# Patient Record
Sex: FEMALE | Race: WHITE | NOT HISPANIC OR LATINO | Employment: UNEMPLOYED | URBAN - METROPOLITAN AREA
[De-identification: names, ages, dates, MRNs, and addresses within clinical notes are randomized per-mention and may not be internally consistent; named-entity substitution may affect disease eponyms.]

---

## 2017-04-04 ENCOUNTER — ALLSCRIPTS OFFICE VISIT (OUTPATIENT)
Dept: OTHER | Facility: OTHER | Age: 52
End: 2017-04-04

## 2017-04-04 DIAGNOSIS — E55.9 VITAMIN D DEFICIENCY: ICD-10-CM

## 2017-04-04 DIAGNOSIS — R53.83 OTHER FATIGUE: ICD-10-CM

## 2017-04-04 DIAGNOSIS — E78.5 HYPERLIPIDEMIA: ICD-10-CM

## 2017-04-04 DIAGNOSIS — F41.9 ANXIETY DISORDER: ICD-10-CM

## 2017-04-04 DIAGNOSIS — Z12.11 ENCOUNTER FOR SCREENING FOR MALIGNANT NEOPLASM OF COLON: ICD-10-CM

## 2017-04-08 ENCOUNTER — TRANSCRIBE ORDERS (OUTPATIENT)
Dept: ADMINISTRATIVE | Facility: HOSPITAL | Age: 52
End: 2017-04-08

## 2017-04-08 ENCOUNTER — APPOINTMENT (OUTPATIENT)
Dept: LAB | Facility: MEDICAL CENTER | Age: 52
End: 2017-04-08
Payer: COMMERCIAL

## 2017-04-08 DIAGNOSIS — R53.83 OTHER FATIGUE: ICD-10-CM

## 2017-04-08 DIAGNOSIS — E78.5 HYPERLIPIDEMIA: ICD-10-CM

## 2017-04-08 DIAGNOSIS — E55.9 VITAMIN D DEFICIENCY: ICD-10-CM

## 2017-04-08 DIAGNOSIS — F41.9 ANXIETY DISORDER: ICD-10-CM

## 2017-04-08 DIAGNOSIS — Z12.11 ENCOUNTER FOR SCREENING FOR MALIGNANT NEOPLASM OF COLON: ICD-10-CM

## 2017-04-08 LAB
25(OH)D3 SERPL-MCNC: 25.5 NG/ML (ref 30–100)
ALBUMIN SERPL BCP-MCNC: 3.8 G/DL (ref 3.5–5)
ALP SERPL-CCNC: 75 U/L (ref 46–116)
ALT SERPL W P-5'-P-CCNC: 24 U/L (ref 12–78)
ANION GAP SERPL CALCULATED.3IONS-SCNC: 8 MMOL/L (ref 4–13)
AST SERPL W P-5'-P-CCNC: 21 U/L (ref 5–45)
BASOPHILS # BLD AUTO: 0.02 THOUSANDS/ΜL (ref 0–0.1)
BASOPHILS NFR BLD AUTO: 0 % (ref 0–1)
BILIRUB SERPL-MCNC: 0.64 MG/DL (ref 0.2–1)
BUN SERPL-MCNC: 17 MG/DL (ref 5–25)
CALCIUM SERPL-MCNC: 8.4 MG/DL (ref 8.3–10.1)
CHLORIDE SERPL-SCNC: 105 MMOL/L (ref 100–108)
CHOLEST SERPL-MCNC: 198 MG/DL (ref 50–200)
CO2 SERPL-SCNC: 28 MMOL/L (ref 21–32)
CREAT SERPL-MCNC: 0.72 MG/DL (ref 0.6–1.3)
EOSINOPHIL # BLD AUTO: 0.07 THOUSAND/ΜL (ref 0–0.61)
EOSINOPHIL NFR BLD AUTO: 1 % (ref 0–6)
ERYTHROCYTE [DISTWIDTH] IN BLOOD BY AUTOMATED COUNT: 14.4 % (ref 11.6–15.1)
GFR SERPL CREATININE-BSD FRML MDRD: >60 ML/MIN/1.73SQ M
GLUCOSE P FAST SERPL-MCNC: 73 MG/DL (ref 65–99)
HCT VFR BLD AUTO: 40.6 % (ref 34.8–46.1)
HDLC SERPL-MCNC: 59 MG/DL (ref 40–60)
HGB BLD-MCNC: 13.1 G/DL (ref 11.5–15.4)
LDLC SERPL CALC-MCNC: 126 MG/DL (ref 0–100)
LYMPHOCYTES # BLD AUTO: 2.24 THOUSANDS/ΜL (ref 0.6–4.47)
LYMPHOCYTES NFR BLD AUTO: 43 % (ref 14–44)
MCH RBC QN AUTO: 29.7 PG (ref 26.8–34.3)
MCHC RBC AUTO-ENTMCNC: 32.3 G/DL (ref 31.4–37.4)
MCV RBC AUTO: 92 FL (ref 82–98)
MONOCYTES # BLD AUTO: 0.48 THOUSAND/ΜL (ref 0.17–1.22)
MONOCYTES NFR BLD AUTO: 9 % (ref 4–12)
NEUTROPHILS # BLD AUTO: 2.36 THOUSANDS/ΜL (ref 1.85–7.62)
NEUTS SEG NFR BLD AUTO: 47 % (ref 43–75)
NRBC BLD AUTO-RTO: 0 /100 WBCS
PLATELET # BLD AUTO: 193 THOUSANDS/UL (ref 149–390)
PMV BLD AUTO: 10.7 FL (ref 8.9–12.7)
POTASSIUM SERPL-SCNC: 4 MMOL/L (ref 3.5–5.3)
PROT SERPL-MCNC: 7.4 G/DL (ref 6.4–8.2)
RBC # BLD AUTO: 4.41 MILLION/UL (ref 3.81–5.12)
SODIUM SERPL-SCNC: 141 MMOL/L (ref 136–145)
TRIGL SERPL-MCNC: 65 MG/DL
TSH SERPL DL<=0.05 MIU/L-ACNC: 1.8 UIU/ML (ref 0.36–3.74)
WBC # BLD AUTO: 5.18 THOUSAND/UL (ref 4.31–10.16)

## 2017-04-08 PROCEDURE — 82306 VITAMIN D 25 HYDROXY: CPT

## 2017-04-08 PROCEDURE — 36415 COLL VENOUS BLD VENIPUNCTURE: CPT

## 2017-04-08 PROCEDURE — 80053 COMPREHEN METABOLIC PANEL: CPT

## 2017-04-08 PROCEDURE — 85025 COMPLETE CBC W/AUTO DIFF WBC: CPT

## 2017-04-08 PROCEDURE — 84443 ASSAY THYROID STIM HORMONE: CPT

## 2017-04-08 PROCEDURE — 80061 LIPID PANEL: CPT

## 2017-05-09 ENCOUNTER — TRANSCRIBE ORDERS (OUTPATIENT)
Dept: ADMINISTRATIVE | Facility: HOSPITAL | Age: 52
End: 2017-05-09

## 2017-05-09 DIAGNOSIS — R52 PAIN: Primary | ICD-10-CM

## 2017-05-09 DIAGNOSIS — Z12.31 ENCOUNTER FOR MAMMOGRAM TO ESTABLISH BASELINE MAMMOGRAM: ICD-10-CM

## 2017-05-09 DIAGNOSIS — N83.201 CYST OF RIGHT OVARY: ICD-10-CM

## 2017-05-30 ENCOUNTER — HOSPITAL ENCOUNTER (OUTPATIENT)
Dept: RADIOLOGY | Age: 52
Discharge: HOME | End: 2017-05-30
Payer: COMMERCIAL

## 2017-05-30 DIAGNOSIS — R52 PAIN: ICD-10-CM

## 2017-05-30 DIAGNOSIS — Z12.31 ENCOUNTER FOR MAMMOGRAM TO ESTABLISH BASELINE MAMMOGRAM: ICD-10-CM

## 2017-05-30 DIAGNOSIS — N83.201 CYST OF RIGHT OVARY: ICD-10-CM

## 2017-05-30 PROCEDURE — 76830 TRANSVAGINAL US NON-OB: CPT

## 2017-05-30 PROCEDURE — 76856 US EXAM PELVIC COMPLETE: CPT

## 2017-05-30 PROCEDURE — G0202 SCR MAMMO BI INCL CAD: HCPCS

## 2017-08-03 ENCOUNTER — TRANSCRIBE ORDERS (OUTPATIENT)
Dept: ADMINISTRATIVE | Facility: HOSPITAL | Age: 52
End: 2017-08-03

## 2017-08-03 DIAGNOSIS — N83.202 BILATERAL OVARIAN CYSTS: Primary | ICD-10-CM

## 2017-08-03 DIAGNOSIS — N83.201 BILATERAL OVARIAN CYSTS: Primary | ICD-10-CM

## 2017-08-24 ENCOUNTER — HOSPITAL ENCOUNTER (OUTPATIENT)
Dept: RADIOLOGY | Age: 52
Discharge: HOME | End: 2017-08-24
Payer: COMMERCIAL

## 2017-08-24 ENCOUNTER — ALLSCRIPTS OFFICE VISIT (OUTPATIENT)
Dept: OTHER | Facility: OTHER | Age: 52
End: 2017-08-24

## 2017-08-24 DIAGNOSIS — R01.1 CARDIAC MURMUR: ICD-10-CM

## 2017-08-24 DIAGNOSIS — N83.202 BILATERAL OVARIAN CYSTS: ICD-10-CM

## 2017-08-24 DIAGNOSIS — N83.201 BILATERAL OVARIAN CYSTS: ICD-10-CM

## 2017-08-24 PROCEDURE — 76830 TRANSVAGINAL US NON-OB: CPT

## 2017-08-24 PROCEDURE — 76856 US EXAM PELVIC COMPLETE: CPT

## 2017-09-21 ENCOUNTER — GENERIC CONVERSION - ENCOUNTER (OUTPATIENT)
Dept: OTHER | Facility: OTHER | Age: 52
End: 2017-09-21

## 2017-10-05 ENCOUNTER — TRANSCRIBE ORDERS (OUTPATIENT)
Dept: ADMINISTRATIVE | Facility: HOSPITAL | Age: 52
End: 2017-10-05

## 2017-10-05 ENCOUNTER — APPOINTMENT (OUTPATIENT)
Dept: URGENT CARE | Facility: MEDICAL CENTER | Age: 52
End: 2017-10-05
Payer: COMMERCIAL

## 2017-10-05 ENCOUNTER — GENERIC CONVERSION - ENCOUNTER (OUTPATIENT)
Dept: OTHER | Facility: OTHER | Age: 52
End: 2017-10-05

## 2017-10-05 ENCOUNTER — APPOINTMENT (OUTPATIENT)
Dept: LAB | Facility: MEDICAL CENTER | Age: 52
End: 2017-10-05
Payer: COMMERCIAL

## 2017-10-05 DIAGNOSIS — N83.01 FOLLICULAR CYST OF RIGHT OVARY: Primary | ICD-10-CM

## 2017-10-05 LAB
ATRIAL RATE: 57 BPM
BASOPHILS # BLD AUTO: 0.02 THOUSANDS/ΜL (ref 0–0.1)
BASOPHILS NFR BLD AUTO: 0 % (ref 0–1)
EOSINOPHIL # BLD AUTO: 0.06 THOUSAND/ΜL (ref 0–0.61)
EOSINOPHIL NFR BLD AUTO: 1 % (ref 0–6)
ERYTHROCYTE [DISTWIDTH] IN BLOOD BY AUTOMATED COUNT: 13.9 % (ref 11.6–15.1)
HCT VFR BLD AUTO: 39 % (ref 34.8–46.1)
HGB BLD-MCNC: 13.1 G/DL (ref 11.5–15.4)
LYMPHOCYTES # BLD AUTO: 3.2 THOUSANDS/ΜL (ref 0.6–4.47)
LYMPHOCYTES NFR BLD AUTO: 34 % (ref 14–44)
MCH RBC QN AUTO: 30.7 PG (ref 26.8–34.3)
MCHC RBC AUTO-ENTMCNC: 33.6 G/DL (ref 31.4–37.4)
MCV RBC AUTO: 91 FL (ref 82–98)
MONOCYTES # BLD AUTO: 0.6 THOUSAND/ΜL (ref 0.17–1.22)
MONOCYTES NFR BLD AUTO: 6 % (ref 4–12)
NEUTROPHILS # BLD AUTO: 5.52 THOUSANDS/ΜL (ref 1.85–7.62)
NEUTS SEG NFR BLD AUTO: 59 % (ref 43–75)
NRBC BLD AUTO-RTO: 0 /100 WBCS
P AXIS: 14 DEGREES
PLATELET # BLD AUTO: 214 THOUSANDS/UL (ref 149–390)
PMV BLD AUTO: 10.4 FL (ref 8.9–12.7)
PR INTERVAL: 172 MS
QRS AXIS: -6 DEGREES
QRSD INTERVAL: 82 MS
QT INTERVAL: 450 MS
QTC INTERVAL: 438 MS
RBC # BLD AUTO: 4.27 MILLION/UL (ref 3.81–5.12)
T WAVE AXIS: 85 DEGREES
VENTRICULAR RATE: 57 BPM
WBC # BLD AUTO: 9.42 THOUSAND/UL (ref 4.31–10.16)

## 2017-10-05 PROCEDURE — 36415 COLL VENOUS BLD VENIPUNCTURE: CPT | Performed by: OBSTETRICS & GYNECOLOGY

## 2017-10-05 PROCEDURE — 85025 COMPLETE CBC W/AUTO DIFF WBC: CPT | Performed by: OBSTETRICS & GYNECOLOGY

## 2017-10-05 PROCEDURE — 93005 ELECTROCARDIOGRAM TRACING: CPT

## 2017-10-06 ENCOUNTER — ANESTHESIA EVENT (OUTPATIENT)
Dept: PERIOP | Facility: HOSPITAL | Age: 52
End: 2017-10-06
Payer: COMMERCIAL

## 2017-10-06 RX ORDER — CITALOPRAM 20 MG/1
10 TABLET ORAL DAILY
COMMUNITY
End: 2018-03-05 | Stop reason: ALTCHOICE

## 2017-10-11 ENCOUNTER — ALLSCRIPTS OFFICE VISIT (OUTPATIENT)
Dept: OTHER | Facility: OTHER | Age: 52
End: 2017-10-11

## 2017-10-11 ENCOUNTER — TRANSCRIBE ORDERS (OUTPATIENT)
Dept: ADMINISTRATIVE | Facility: HOSPITAL | Age: 52
End: 2017-10-11

## 2017-10-11 DIAGNOSIS — Z01.810 PRE-OPERATIVE CARDIOVASCULAR EXAMINATION: Primary | ICD-10-CM

## 2017-10-11 DIAGNOSIS — Z01.810 ENCOUNTER FOR PREPROCEDURAL CARDIOVASCULAR EXAMINATION: ICD-10-CM

## 2017-10-12 ENCOUNTER — ALLSCRIPTS OFFICE VISIT (OUTPATIENT)
Dept: OTHER | Facility: OTHER | Age: 52
End: 2017-10-12

## 2017-10-13 ENCOUNTER — HOSPITAL ENCOUNTER (OUTPATIENT)
Dept: NON INVASIVE DIAGNOSTICS | Facility: HOSPITAL | Age: 52
Discharge: HOME | End: 2017-10-13
Payer: COMMERCIAL

## 2017-10-13 DIAGNOSIS — Z01.810 PRE-OPERATIVE CARDIOVASCULAR EXAMINATION: ICD-10-CM

## 2017-10-13 LAB
ARRHY DURING EX: NORMAL
CHEST PAIN STATEMENT: NORMAL
MAX DIASTOLIC BP: 80 MMHG
MAX HEART RATE: 162 BPM
MAX PREDICTED HEART RATE: 168 BPM
MAX. SYSTOLIC BP: 168 MMHG
PROTOCOL NAME: NORMAL
REASON FOR TERMINATION: NORMAL
TARGET HR FORMULA: NORMAL
TEST INDICATION: NORMAL
TIME IN EXERCISE PHASE: 331 S

## 2017-10-13 PROCEDURE — 93350 STRESS TTE ONLY: CPT

## 2017-10-13 NOTE — H&P
H&P Exam - Gynecology   Britta Cruz 46 y o  female MRN: [id number]  Unit/Bed#:  Encounter: [id number]      Assessment: chronic right pain                          Complex right ovarian cyst    Plan: laparoscopic BSO      HPI:  Britta Cruz is a 46 y o   female who presents with chronic right pelvic pain worse over the past year  U/S: ut; 6 8 x 3 4 x 5 cm  No fibroids  Left ovary:not seen, right ovary; complex 2 2cm cyst   PAP 3/17: normal            Pt denies any vaginal bleeding  LMP;  No HRT  Review of Systems   Constitutional: Negative  HENT: Negative  Eyes: Negative  Respiratory: Negative  Cardiovascular: Negative  Gastrointestinal: Negative  Endocrine: Negative  Genitourinary: Positive for pelvic pain  Musculoskeletal: Negative  Skin: Negative  Allergic/Immunologic: Negative  Neurological: Negative  Hematological: Negative  Psychiatric/Behavioral: Negative  Historical Information   Past Medical History:   Diagnosis Date    Murmur     MVP (mitral valve prolapse)     PONV (postoperative nausea and vomiting)      Past Surgical History:   Procedure Laterality Date    APPENDECTOMY       SECTION      EXPLORATORY LAPAROTOMY      THYROID SURGERY      THYROID SURGERY      goiter     OB/GYN History:c/section x2    No family history on file  Social History   History   Alcohol Use    0 6 oz/week    1 Glasses of wine per week     History   Drug Use No     History   Smoking Status    Never Smoker   Smokeless Tobacco    Never Used     MEDS: see list    Allergies   Allergen Reactions    Morphine     Nafcillin     Septra [Sulfamethoxazole-Trimethoprim]        Objective   Vitals: There were no vitals taken for this visit  No intake or output data in the 24 hours ending 10/13/17 1045    Invasive Devices:    Invasive Devices          No matching active lines, drains, or airways          Physical Exam   Constitutional: She is oriented to person, place, and time  She appears well-developed and well-nourished  HENT:   Head: Normocephalic and atraumatic  Eyes: Conjunctivae and EOM are normal  Pupils are equal, round, and reactive to light  Neck: Normal range of motion  Neck supple  Cardiovascular: Normal rate and regular rhythm  Pulmonary/Chest: Effort normal and breath sounds normal    Abdominal: Soft  Bowel sounds are normal    Genitourinary: Vagina normal and uterus normal  Cervix exhibits no motion tenderness, no discharge and no friability  Right adnexum displays tenderness  Left adnexum displays no mass and no tenderness  No erythema, tenderness or bleeding in the vagina  No foreign body in the vagina  No signs of injury around the vagina  No vaginal discharge found  Musculoskeletal: Normal range of motion  Neurological: She is alert and oriented to person, place, and time  She has normal reflexes  Skin: Skin is warm and dry  Psychiatric: She has a normal mood and affect   Her behavior is normal  Judgment and thought content normal

## 2017-10-13 NOTE — CONSULTS
Assessment  1  Cardiac murmur (785 2) (R01 1)   2  Abnormal EKG (794 31) (R94 31)   3  Preoperative cardiovascular examination (V72 81) (Z01 810)    Plan  Preoperative cardiovascular examination    · ECHO STRESS TEST W CONTRAST IF INDICATED; Status:Need Information - Financial  Authorization; Requested for:11Oct2017;    Perform:[facility]; Due:11Oct2018; Ordered; For:Preoperative cardiovascular examination; Ordered By:Jason Roberson;   · Follow-up PRN Evaluation and Treatment  Follow-up  Status: Complete  Done:  [id number]   Ordered; For: Preoperative cardiovascular examination; Ordered By: Ana Boyce Performed:  Due: 65NJE3498    Discussion/Summary    Preop cardiac clearance  abnormal ekg and murmur  non invasive eval recommended  Chief Complaint  Pt  here for cardiac clearance prior to having gyn/ovarian surgery on 10/17/2017  Pt  has occasional palpitations  History of Present Illness  Cardiology HPI Free Text Note Form [facility]: Preoperative cardiac clearance, history heart murmur  Currently having no symptoms  Recent EKG personally reviewed reveals normal sinus rhythm, there is lateral T-wave inversions slightly abnormal  Temporary progression across the precordium  Review of Systems      Cardiac: has heart murmur present, but-as noted in HPI,-no chest pain,-no rhythm problems,-no fainting/blackouts,-no signs of swelling,-no palpitations present,-no syncope/fainting,-no AM fatigue-and-no witnessed apnea episodes  Skin: No complaints of nonhealing sores or skin rash ,-no non healing sores present-and-no rashes seen   Genitourinary: post menopausal, but-no recurrent urinary tract infections,-no difficult urination,-no blood in urine,-no loss of bladder control-and-no kidney problems   Psychological: No complaints of feeling depressed, anxiety, panic attacks, or difficulty concentrating ,-no depression-and-no anxiety     General: No complaints of trouble sleeping, lack of energy, fatigue, appetite changes, weight changes, fever, frequent infections, or night sweats  ,-no trouble sleeping,-no appetite changes,-no changes in weight-and-no lack of energy/fatigue  Respiratory: No complaints of shortness of breath, cough with sputum, or wheezing ,-no shortness of breath,-no cough/sputum,-no wheezing-and-no hemoptysis  HEENT: hearing problems, but-no serious eye problems   Gastrointestinal: No complaints of liver problems, nausea, vomiting, heartburn, constipation, bloody stools, diarrhea, problems swallowing, adbominal pain, or rectal bleeding ,-no liver problems,-no bloody stools,-no abdonimal pain-and-no rectal bleeding   Hematologic: No complaints of bleeding disorders, anemia, blood clots, or excessive brusing ,-no bleeding disorders-and-no excessive bruising   Neurological: No complaints of numbness, tingling, dizziness, weakness, seizures, headaches, syncope or fainting, AM fatigue, daytime sleepiness, no witnessed apnea episodes  ,-no numbness,-no tingling,-no weakness,-no seizures,-no headaches,-no dizziness,-no diplopia-and-no daytime sleepiness   Musculoskeletal: No complaints of arthritis, back pain, or painfull swelling ,-no arthritis,-no back pain-and-no swelling/pain      Active Problems  1  Abdominal pain (789 00) (R10 9)   2  Acute sinusitis (461 9) (J01 90)   3  Acute URI (465 9) (J06 9)   4  Allergic rhinitis (477 9) (J30 9)   5  Anxiety (300 00) (F41 9)   6  Bronchitis (490) (J40)   7  Cardiac murmur (785 2) (R01 1)   8  Chest wall injury (959 11) (S29 9XXA)   9  Chronic depressive disorder (301 12) (F32 9)   10  Colon cancer screening (V76 51) (Z12 11)   11  Complete Colonoscopy   12  Cough (786 2) (R05)   13  Elbow pain (719 42) (M25 529)   14  Fatigue (780 79) (R53 83)   15  Hyperlipidemia (272 4) (E78 5)   16  Left shoulder pain (719 41) (M25 512)   17  Lower back pain (724 2) (M54 5)   18  Muscle strain of chest wall (848 8) (S29 011A)   19   Need for influenza vaccination (V04 81) (Z23)   20  Pharyngitis (462) (J02 9)   21  Rosacea (695 3) (L71 9)   22  Sacroiliitis (720 2) (M46 1)   23  Vitamin D deficiency (268 9) (E55 9)   24  Well adult on routine health check (V70 0) (Z00 00)    Past Medical History   · History of Denial Of Any Significant Medical History   · Former smoker (N90 45) (W92 382)   · History of Influenza vaccination administered during current admission (V04 81) (Z23)    The active problems and past medical history were reviewed and updated today  Surgical History   · History of  Section   · History of Exploratory Laparoscopy   · History of Throat Surgery    The surgical history was reviewed and updated today  Family History  Mother    · Family history of Mother  At Age 72   · Family history of Stroke Syndrome (V17 1)  Father    · Family history of Cancer   · Family history of Father  At Age 79  Family History Reviewed: The family history was reviewed and updated today  Social History   · Being A Social Drinker   · Caffeine Use   · Former smoker (U52 64) (X75 264)  The social history was reviewed and updated today  The social history was reviewed and is unchanged  Current Meds   1  Citalopram Hydrobromide 10 MG Oral Tablet; TAKE 1 TABLET DAILY; Therapy: 08TKP3072 to (Evaluate:2018)  Requested for: 64EMB2211; Last   Rx:[id number] Ordered   2  Ibuprofen 600 MG Oral Tablet; TAKE 1 TABLET 3 TIMES DAILY AS NEEDED; Therapy: 52PVC6653 to (Evaluate:00Vwa6140)  Requested for: 2016; Last   Rx:2016 Ordered    The medication list was reviewed and updated today  Allergies  1  Morphine Sulfate SOLN   2  Nafcillin Sodium SOLR   3  Penicillins   4   Septra TABS    Vitals  Signs   Heart Rate: 72, Apical  Pulse Quality: Regular, Apical  Systolic: 103, RUE, Sitting  Diastolic: 70, RUE, Sitting  Height: 4 ft 8 5 in  Weight: 107 lb   BMI Calculated: 23 57  BSA Calculated: 1 37    Physical Exam    Constitutional General appearance: No acute distress, well appearing and well nourished  appears healthy,-within normal limits of ideal weight,-well hydrated-and-appearance reflects stated age  Eyes   Conjunctiva and Sclera examination: Conjunctiva pink, sclera anicteric  both conjunctiva were norml and pink  Ears, Nose, Mouth, and Throat - Oropharynx: Clear, nares are clear, mucous membranes are moist  Inspection of the oropharynx showed visible hard and soft palate, upper portion of tonsils and uvula (Mallampati class 2)  Oral mucosa was not pale  Neck   Neck and thyroid: Normal, supple, trachea midline, no thyromegaly  The neck was normal in appearance-and-was supple  the trachea was midline  Jugular Veins: the JVP was not elevated-and-no sustained hepatojugular reflux  The thyroid was normal-and-was not enlarged  There were no thyroid nodules  Pulmonary   Respiratory effort: No increased work of breathing or signs of respiratory distress  Respiratory rate: normal  Assessment of respiratory effort revealed normal rhythm and effort  Auscultation of lungs: Clear to auscultation, no rales, no rhonchi, no wheezing, good air movement  Auscultation of the lungs revealed no expiratory wheezing,-normal expiratory time-and-no inspiratory wheezing  no rales or crackles were heard bilaterally  no rhonchi  no friction rub  no wheezing  no diminished breath sounds  no bronchial breath sounds  Cardiovascular   Palpation of heart: Normal PMI, no thrills  The PMI was palpated at the 5th LICS in the midclavicular line  The apical impulse was normal  no precordial heave was noted  Auscultation of heart: Abnormal   The heart rate was normal  The rhythm was regular  Heart sounds: normal S1,-normal S2,-no gallop heard,-no click-and-the heart sounds were not distant  No pericardial rub  A grade 1 systolic murmur was heard at the LLSB  A grade 2 systolic murmur was heard at the RUSB   A grade 3 systolic murmur was heard at the LUSB  A grade 1 systolic murmur was heard at the RLSB  Carotid pulses: Normal, 2+ bilaterally  right 2+,-no bruit heard over the right carotid,-left 2+-and-no bruit heard over the left carotid  Peripheral vascular exam: Normal pulses throughout, no tenderness, erythema or swelling  Radial: right 2+,-left 2+  Femoral: right 2+,-no bruit heard over the right femoral artery,-left 2+,-no bruit heard over the left femoral artery  Posterior tibialis: right 2+,-left 2+  Dorsalis pedis: right 2+,-left 2+ no pulse delay  Capillary refill: capillary refill of the fingers was normal  no pitting edema present  no varicosital changes  Abdominal aorta: Normal     Chest - Chest: Normal    Abdomen   Abdomen: Non-tender and no distention  Liver and spleen: No hepatomegaly or splenomegaly  Musculoskeletal Digits and nails: Normal without clubbing or cyanosis  Examination of the extremities revealed no fingernail clubbing-and-well perfused fingers  Skin - Skin and subcutaneous tissue: Normal without rashes or lesions  Skin is warm and well perfused, normal turgor  Skin Inspection: fair complexion, but-normal color and pigmentation,-no cyanosis-and-no diaphoresis  Neurologic - Speech: Normal     Psychiatric - Orientation to person, place, and time: Normal -Mood and affect: Normal       Future Appointments    Date/Time Provider Specialty Site   10/12/2017 06:30 PM DANIEL Del Castillo  [address] [facility]   02/28/2018 10:00 AM DANIEL Del Castillo  [address]     End of Encounter Meds  1  Citalopram Hydrobromide 10 MG Oral Tablet; TAKE 1 TABLET DAILY; Therapy: 23RLG8076 to (Evaluate:26Mar2018)  Requested for: 18FWK1747; Last   Rx:[id number] Ordered  2  Ibuprofen 600 MG Oral Tablet; TAKE 1 TABLET 3 TIMES DAILY AS NEEDED;    Therapy: 21ZDA3428 to (Evaluate:10Niz2797)  Requested for: 30Jun2016; Last   ND:65WGU4683 Ordered    Signatures   Electronically signed by : Yamileth Lozano [place], M D ; Oct 11 2017  3:05PM EST                       (Author)

## 2017-10-14 NOTE — PROGRESS NOTES
Assessment  1  Ovarian cyst (620 2) (N83 209)    Plan    Medically cleared for surgery pending cardiac clearance  Chief Complaint  Scheduled for Laparoscopic BSO on 10/17/17 with Dr Brook Kelley  Saw cardiology yesterday and has ECHO and Stress test scheduled for tomorrow before cardiac clearance is given by Dr Jenna Newby  History of Present Illness  Pre-Op Visit (Brief): The patient is being seen for a preoperative visit  The procedure is a(n) Bilateral oophorectomy scheduled for 10/17 with Dr Brook Kelley  The indication for surgery is chocolate cysts  Surgical Risk Assessment:   Prior Anesthesia: She had prior anesthesia,-- no prior adverse reaction to edidural anesthesia,-- no prior adverse reaction to spinal anesthesia,-- no prior adverse reaction to general anesthesia-- and-- Nausea and vomiting  Pertinent Past Medical History: no pertinent past medical history  Exercise Capacity: unable to walk four blocks without symptoms-- and-- unable to walk two flights of stairs without symptoms  Lifestyle Factors: denies alcohol use, denies tobacco use and denies illegal drug use  Symptoms: no easy bleeding,-- easy bruising,-- no frequent nosebleeds,-- no chest pain,-- no cough,-- no dyspnea,-- no edema,-- no palpitations-- and-- no wheezing  Other JAZMINE risk factors include age over 48, but-- normal BMI,-- female gender-- and-- normal neck circumference  Predicted risk of JAZMINE: None  Pertinent Family History: Mother had stroke, but-- no family history of an adverse reaction to anesthesia,-- no aneurysm,-- no bleeding problems,-- no sudden early deaths,-- no ischemic heart disease-- and-- no stroke  Living Situation: home is secure and supportive  HPI: Justina Osborn saw cardiology today for her heart murmur  Getting a stress test and echocardiogram tomorrow  She hopefully will then get cardiac clearance  Review of Systems    Cardiovascular: as noted in HPI  Respiratory: as noted in HPI     Gastrointestinal: No complaints of abdominal pain, no constipation, no nausea or vomiting, no diarrhea, no bloody stools  Genitourinary: No complaints of dysuria, no incontinence, no pelvic pain, no dysmenorrhea, no vaginal discharge or bleeding  Active Problems  1  Abdominal pain (789 00) (R10 9)   2  Abnormal EKG (794 31) (R94 31)   3  Acute sinusitis (461 9) (J01 90)   4  Acute URI (465 9) (J06 9)   5  Allergic rhinitis (477 9) (J30 9)   6  Anxiety (300 00) (F41 9)   7  Bronchitis (490) (J40)   8  Cardiac murmur (785 2) (R01 1)   9  Chest wall injury (959 11) (S29 9XXA)   10  Chronic depressive disorder (301 12) (F32 9)   11  Colon cancer screening (V76 51) (Z12 11)   12  Complete Colonoscopy   13  Cough (786 2) (R05)   14  Elbow pain (719 42) (M25 529)   15  Fatigue (780 79) (R53 83)   16  Hyperlipidemia (272 4) (E78 5)   17  Left shoulder pain (719 41) (M25 512)   18  Lower back pain (724 2) (M54 5)   19  Muscle strain of chest wall (848 8) (S29 011A)   20  Need for influenza vaccination (V04 81) (Z23)   21  Pharyngitis (462) (J02 9)   22  Preoperative cardiovascular examination (V72 81) (Z01 810)   23  Rosacea (695 3) (L71 9)   24  Sacroiliitis (720 2) (M46 1)   25  Vitamin D deficiency (268 9) (E55 9)   26  Well adult on routine health check (V70 0) (Z00 00)    Past Medical History   · History of Denial Of Any Significant Medical History   · Former smoker (V15 82) (S30 323)   · History of Influenza vaccination administered during current admission (V04 81) (Z23)    Surgical History   · History of  Section   · History of Exploratory Laparoscopy   · History of Throat Surgery    Family History  Mother    · Family history of Mother  At Age 72   · Family history of Stroke Syndrome (V17 1)  Father    · Family history of Cancer   · Family history of Father  At Age 79    Social History   · Being A Social Drinker   · Caffeine Use   · Former smoker (M33 32) (Z90 915)    Current Meds   1   Caltrate 600 Plus-Vit D TABS; Therapy: (Recorded:12Oct2017) to Recorded   2  Citalopram Hydrobromide 10 MG Oral Tablet; TAKE 1 TABLET DAILY; Therapy: 60HVV3786 to (Evaluate:26Mar2018)  Requested for: 58IBT0364; Last   Rx:[id number] Ordered    Allergies  1  Morphine Sulfate SOLN   2  Nafcillin Sodium SOLR   3  Penicillins   4  Septra TABS    Physical Exam    Constitutional   General appearance: No acute distress, well appearing and well nourished  Head and Face   Head and face: Normal     Palpation of the face and sinuses: No sinus tenderness  Eyes   Conjunctiva and lids: No swelling, erythema or discharge  Pupils and irises: Equal, round, reactive to light  Ears, Nose, Mouth, and Throat   Otoscopic examination: Tympanic membranes translucent with normal light reflex  Canals patent without erythema  Lips, teeth, and gums: Normal, good dentition  Oropharynx: Normal with no erythema, edema, exudate or lesions  Neck   Neck: Supple, symmetric, trachea midline, no masses  Thyroid: Normal, no thyromegaly  Pulmonary   Respiratory effort: No increased work of breathing or signs of respiratory distress  Auscultation of lungs: Clear to auscultation  Cardiovascular   Auscultation of heart: Abnormal  -- Systolic murmur at both sternal borders and apex  Grad 2/6  Carotid pulses: 2+ bilaterally  Abdominal aorta: Normal     Femoral pulses: 2+ bilaterally  Pedal pulses: 2+ bilaterally  Peripheral vascular exam: Normal     Examination of extremities for edema and/or varicosities: Normal     Abdomen   Abdomen: Non-tender, no masses  Liver and spleen: No hepatomegaly or splenomegaly  Lymphatic   Palpation of lymph nodes in neck: No lymphadenopathy  Palpation of lymph nodes in groin: No lymphadenopathy  Musculoskeletal   Gait and station: Normal     Psychiatric   Judgment and insight: Normal     Mood and affect: Normal        Results/Data  EKG/CBC WNL        End of Encounter Meds  1   Citalopram Hydrobromide 10 MG Oral Tablet; TAKE 1 TABLET DAILY; Therapy: 03LYE0292 to (Evaluate:26Mar2018)  Requested for: 32DMD7975; Last   Rx:[id number] Ordered  2  Caltrate 600 Plus-Vit D TABS; Therapy: (Recorded:12Oct2017) to Recorded    Future Appointments    Date/Time Provider Specialty Site   02/28/2018 10:00 AM DANIEL Arellano   [address] [facility] [employer]     Signatures   Electronically signed by : DANIEL Howard ; Oct 12 2017 10:48PM EST                       (Author)

## 2017-10-16 ENCOUNTER — GENERIC CONVERSION - ENCOUNTER (OUTPATIENT)
Dept: OTHER | Facility: OTHER | Age: 52
End: 2017-10-16

## 2017-10-17 ENCOUNTER — ANESTHESIA (OUTPATIENT)
Dept: PERIOP | Facility: HOSPITAL | Age: 52
End: 2017-10-17
Payer: COMMERCIAL

## 2017-10-17 ENCOUNTER — HOSPITAL ENCOUNTER (OUTPATIENT)
Facility: HOSPITAL | Age: 52
Setting detail: OUTPATIENT SURGERY
Discharge: HOME | End: 2017-10-17
Attending: OBSTETRICS & GYNECOLOGY | Admitting: OBSTETRICS & GYNECOLOGY
Payer: COMMERCIAL

## 2017-10-17 VITALS
HEART RATE: 86 BPM | BODY MASS INDEX: 22.25 KG/M2 | TEMPERATURE: 97.3 F | OXYGEN SATURATION: 99 % | RESPIRATION RATE: 18 BRPM | DIASTOLIC BLOOD PRESSURE: 65 MMHG | HEIGHT: 58 IN | SYSTOLIC BLOOD PRESSURE: 101 MMHG | WEIGHT: 106 LBS

## 2017-10-17 DIAGNOSIS — N83.201 UNSPECIFIED OVARIAN CYST, RIGHT SIDE: ICD-10-CM

## 2017-10-17 PROCEDURE — 88305 TISSUE EXAM BY PATHOLOGIST: CPT | Performed by: OBSTETRICS & GYNECOLOGY

## 2017-10-17 RX ORDER — METOCLOPRAMIDE HYDROCHLORIDE 5 MG/ML
10 INJECTION INTRAMUSCULAR; INTRAVENOUS ONCE AS NEEDED
Status: DISCONTINUED | OUTPATIENT
Start: 2017-10-17 | End: 2017-10-17 | Stop reason: HOSPADM

## 2017-10-17 RX ORDER — ONDANSETRON 2 MG/ML
4 INJECTION INTRAMUSCULAR; INTRAVENOUS ONCE AS NEEDED
Status: DISCONTINUED | OUTPATIENT
Start: 2017-10-17 | End: 2017-10-17 | Stop reason: HOSPADM

## 2017-10-17 RX ORDER — KETOROLAC TROMETHAMINE 30 MG/ML
INJECTION, SOLUTION INTRAMUSCULAR; INTRAVENOUS AS NEEDED
Status: DISCONTINUED | OUTPATIENT
Start: 2017-10-17 | End: 2017-10-17 | Stop reason: SURG

## 2017-10-17 RX ORDER — GLYCOPYRROLATE 0.2 MG/ML
INJECTION INTRAMUSCULAR; INTRAVENOUS AS NEEDED
Status: DISCONTINUED | OUTPATIENT
Start: 2017-10-17 | End: 2017-10-17 | Stop reason: SURG

## 2017-10-17 RX ORDER — EPHEDRINE SULFATE 50 MG/ML
INJECTION, SOLUTION INTRAVENOUS AS NEEDED
Status: DISCONTINUED | OUTPATIENT
Start: 2017-10-17 | End: 2017-10-17 | Stop reason: SURG

## 2017-10-17 RX ORDER — SCOLOPAMINE TRANSDERMAL SYSTEM 1 MG/1
1 PATCH, EXTENDED RELEASE TRANSDERMAL ONCE
Status: DISCONTINUED | OUTPATIENT
Start: 2017-10-17 | End: 2017-10-17 | Stop reason: HOSPADM

## 2017-10-17 RX ORDER — IBUPROFEN 600 MG/1
600 TABLET ORAL EVERY 6 HOURS PRN
Status: DISCONTINUED | OUTPATIENT
Start: 2017-10-17 | End: 2017-10-17 | Stop reason: HOSPADM

## 2017-10-17 RX ORDER — MIDAZOLAM HYDROCHLORIDE 1 MG/ML
INJECTION INTRAMUSCULAR; INTRAVENOUS AS NEEDED
Status: DISCONTINUED | OUTPATIENT
Start: 2017-10-17 | End: 2017-10-17 | Stop reason: SURG

## 2017-10-17 RX ORDER — FENTANYL CITRATE/PF 50 MCG/ML
25 SYRINGE (ML) INJECTION
Status: DISCONTINUED | OUTPATIENT
Start: 2017-10-17 | End: 2017-10-17 | Stop reason: HOSPADM

## 2017-10-17 RX ORDER — ROCURONIUM BROMIDE 10 MG/ML
INJECTION, SOLUTION INTRAVENOUS AS NEEDED
Status: DISCONTINUED | OUTPATIENT
Start: 2017-10-17 | End: 2017-10-17 | Stop reason: SURG

## 2017-10-17 RX ORDER — SODIUM CHLORIDE, SODIUM LACTATE, POTASSIUM CHLORIDE, CALCIUM CHLORIDE 600; 310; 30; 20 MG/100ML; MG/100ML; MG/100ML; MG/100ML
20 INJECTION, SOLUTION INTRAVENOUS CONTINUOUS
Status: DISCONTINUED | OUTPATIENT
Start: 2017-10-17 | End: 2017-10-17 | Stop reason: HOSPADM

## 2017-10-17 RX ORDER — PROPOFOL 10 MG/ML
INJECTION, EMULSION INTRAVENOUS AS NEEDED
Status: DISCONTINUED | OUTPATIENT
Start: 2017-10-17 | End: 2017-10-17 | Stop reason: SURG

## 2017-10-17 RX ORDER — FENTANYL CITRATE 50 UG/ML
INJECTION, SOLUTION INTRAMUSCULAR; INTRAVENOUS AS NEEDED
Status: DISCONTINUED | OUTPATIENT
Start: 2017-10-17 | End: 2017-10-17 | Stop reason: SURG

## 2017-10-17 RX ORDER — DIPHENHYDRAMINE HYDROCHLORIDE 50 MG/ML
12.5 INJECTION INTRAMUSCULAR; INTRAVENOUS ONCE AS NEEDED
Status: DISCONTINUED | OUTPATIENT
Start: 2017-10-17 | End: 2017-10-17 | Stop reason: HOSPADM

## 2017-10-17 RX ORDER — SODIUM CHLORIDE, SODIUM LACTATE, POTASSIUM CHLORIDE, CALCIUM CHLORIDE 600; 310; 30; 20 MG/100ML; MG/100ML; MG/100ML; MG/100ML
125 INJECTION, SOLUTION INTRAVENOUS CONTINUOUS
Status: DISCONTINUED | OUTPATIENT
Start: 2017-10-17 | End: 2017-10-17 | Stop reason: HOSPADM

## 2017-10-17 RX ORDER — PROPOFOL 10 MG/ML
INJECTION, EMULSION INTRAVENOUS CONTINUOUS PRN
Status: DISCONTINUED | OUTPATIENT
Start: 2017-10-17 | End: 2017-10-17 | Stop reason: SURG

## 2017-10-17 RX ORDER — OXYCODONE HYDROCHLORIDE AND ACETAMINOPHEN 5; 325 MG/1; MG/1
1 TABLET ORAL EVERY 4 HOURS PRN
Status: DISCONTINUED | OUTPATIENT
Start: 2017-10-17 | End: 2017-10-17 | Stop reason: HOSPADM

## 2017-10-17 RX ORDER — BUPIVACAINE HYDROCHLORIDE 2.5 MG/ML
INJECTION, SOLUTION INFILTRATION; PERINEURAL AS NEEDED
Status: DISCONTINUED | OUTPATIENT
Start: 2017-10-17 | End: 2017-10-17 | Stop reason: HOSPADM

## 2017-10-17 RX ORDER — LIDOCAINE HYDROCHLORIDE 10 MG/ML
INJECTION, SOLUTION INFILTRATION; PERINEURAL AS NEEDED
Status: DISCONTINUED | OUTPATIENT
Start: 2017-10-17 | End: 2017-10-17 | Stop reason: SURG

## 2017-10-17 RX ORDER — ONDANSETRON 2 MG/ML
INJECTION INTRAMUSCULAR; INTRAVENOUS AS NEEDED
Status: DISCONTINUED | OUTPATIENT
Start: 2017-10-17 | End: 2017-10-17 | Stop reason: SURG

## 2017-10-17 RX ADMIN — FENTANYL CITRATE 25 MCG: 50 INJECTION INTRAMUSCULAR; INTRAVENOUS at 09:54

## 2017-10-17 RX ADMIN — SODIUM CHLORIDE, SODIUM LACTATE, POTASSIUM CHLORIDE, AND CALCIUM CHLORIDE: .6; .31; .03; .02 INJECTION, SOLUTION INTRAVENOUS at 07:45

## 2017-10-17 RX ADMIN — PROPOFOL 100 MCG/KG/MIN: 10 INJECTION, EMULSION INTRAVENOUS at 08:03

## 2017-10-17 RX ADMIN — EPHEDRINE SULFATE 5 MG: 50 INJECTION, SOLUTION INTRAMUSCULAR; INTRAVENOUS; SUBCUTANEOUS at 08:10

## 2017-10-17 RX ADMIN — ONDANSETRON 4 MG: 2 INJECTION INTRAMUSCULAR; INTRAVENOUS at 09:04

## 2017-10-17 RX ADMIN — SCOPOLAMINE 1 PATCH: 1 PATCH, EXTENDED RELEASE TRANSDERMAL at 07:13

## 2017-10-17 RX ADMIN — NEOSTIGMINE METHYLSULFATE 3 MG: 1 INJECTION, SOLUTION INTRAMUSCULAR; INTRAVENOUS; SUBCUTANEOUS at 09:26

## 2017-10-17 RX ADMIN — GLYCOPYRROLATE 0.6 MG: 0.2 INJECTION, SOLUTION INTRAMUSCULAR; INTRAVENOUS at 09:26

## 2017-10-17 RX ADMIN — FENTANYL CITRATE 100 MCG: 50 INJECTION, SOLUTION INTRAMUSCULAR; INTRAVENOUS at 07:54

## 2017-10-17 RX ADMIN — DEXAMETHASONE SODIUM PHOSPHATE 10 MG: 10 INJECTION INTRAMUSCULAR; INTRAVENOUS at 08:01

## 2017-10-17 RX ADMIN — OXYCODONE HYDROCHLORIDE AND ACETAMINOPHEN 1 TABLET: 5; 325 TABLET ORAL at 11:09

## 2017-10-17 RX ADMIN — FENTANYL CITRATE 25 MCG: 50 INJECTION INTRAMUSCULAR; INTRAVENOUS at 10:15

## 2017-10-17 RX ADMIN — ROCURONIUM BROMIDE 30 MG: 10 INJECTION, SOLUTION INTRAVENOUS at 07:56

## 2017-10-17 RX ADMIN — LIDOCAINE HYDROCHLORIDE 80 MG: 10 INJECTION, SOLUTION INFILTRATION; PERINEURAL at 07:54

## 2017-10-17 RX ADMIN — PROPOFOL 150 MG: 10 INJECTION, EMULSION INTRAVENOUS at 07:54

## 2017-10-17 RX ADMIN — MIDAZOLAM HYDROCHLORIDE 2 MG: 1 INJECTION, SOLUTION INTRAMUSCULAR; INTRAVENOUS at 07:46

## 2017-10-17 RX ADMIN — SODIUM CHLORIDE, SODIUM LACTATE, POTASSIUM CHLORIDE, AND CALCIUM CHLORIDE: .6; .31; .03; .02 INJECTION, SOLUTION INTRAVENOUS at 09:35

## 2017-10-17 RX ADMIN — KETOROLAC TROMETHAMINE 30 MG: 30 INJECTION, SOLUTION INTRAMUSCULAR at 09:11

## 2017-10-17 NOTE — PROGRESS NOTES
Surgery Scheduling Form  Pre-Operative Risk Assessment:   Date Last Seen: 10/11/17   Date of Surgery: 10/1717   Type of Surgery: laparoscopic BSO   Surgeon Name: Dr Melvina Stubbs Number: [phone]     Pulmonary: No Pulmonary Contraindication for planned surgical procedure   Cardiac: No Cardiac Contraindication for planned surgical procedure   Further Testing Required: No   Anticoagulation: No   Patient Approved for Surgery: Yes   Clearing Doctor: Britt Covarrubias   Electronically signed by : DANIEL Zepeda ; Oct 16 2017  9:43AM EST                       (Author)

## 2017-10-17 NOTE — ANESTHESIA PREPROCEDURE EVALUATION
Review of Systems/Medical History  Patient summary reviewed  Chart reviewed  History of anesthetic complications PONV    Cardiovascular  EKG reviewed, Valvular heart disease , H/O Heart Murmur,   Comment: Summary stress test 10/2017    STRESS RESULTS: Duration of exercise was 5 min and 31 sec  The patient exercised to protocol stage 2  Maximal work rate was 7 METs  Functional capacity was decreased  Maximal heart rate during stress was 162 bpm ( 96 % of maximal predicted  heart rate)  Target heart rate was achieved  The heart rate response to stress was normal  Maximal systolic blood pressure during stress was 168 mmHg  There was normal resting blood pressure with an appropriate response to stress  The  rate-pressure product for the peak heart rate and blood pressure was 85315  There was no chest pain during stress  The stress test was terminated due to moderate fatigue      ECG CONCLUSIONS: The stress ECG was negative for ischemia  There were no stress arrhythmias or conduction abnormalities  ,  Pulmonary  Negative pulmonary ROS No COPD , No asthma: , No shortness of breath, No recent URI , ,        GI/Hepatic  Negative GI/hepatic ROS          Negative  ROS        Endo/Other  Negative endo/other ROS      GYN       Hematology  Negative hematology ROS      Musculoskeletal  Negative musculoskeletal ROS        Neurology  Negative neurology ROS      Psychology   Anxiety,            Physical Exam    Airway    Mallampati score: IV  TM Distance: >3 FB  Neck ROM: full     Dental       Cardiovascular  Rhythm: regular, Rate: normal, Cardiovascular exam normal    Pulmonary  Pulmonary exam normal Breath sounds clear to auscultation,     Other Findings        Anesthesia Plan  ASA Score- 2       Anesthesia Type- general with ASA Monitors  Additional Monitors:   Airway Plan: ETT  Induction- intravenous  Informed Consent- Anesthetic plan and risks discussed with patient    I personally reviewed this patient with the CRNA  Discussed and agreed on the Anesthesia Plan with the CRNA  Marika Lombardo

## 2017-10-17 NOTE — DISCHARGE INSTRUCTIONS
Laparoscopic Salpingo-oophorectomy   WHAT YOU SHOULD KNOW:   Laparoscopic salpingo-oophorectomy is surgery to remove one or both fallopian tubes together with the ovaries  AFTER YOU LEAVE:   Medicines:   · NSAIDs  help decrease swelling, pain, and fever  This medicine is available without a doctor's order  NSAIDs can cause stomach bleeding or kidney problems  If you take blood thinner medicine, always ask your primary healthcare provider (PHP) if NSAIDs are safe for you  Always read the medicine label and follow directions  · Acetaminophen  decreases pain and fever  It is available without a doctor's order  Ask how much to take and how often to take it  Follow directions  Acetaminophen can cause liver damage if not taken correctly  · Prescription pain medicine  may be given  Ask your PHP how to take this medicine safely  · Take your medicine as directed  Contact your PHP if you think your medicine is not helping or if you have side effects  Tell him if you are allergic to any medicine  Keep a list of the medicines, vitamins, and herbs you take  Include the amounts, and when and why you take them  Bring the list or the pill bottles to follow-up visits  Carry your medicine list with you in case of an emergency  Follow up with your surgeon or gynecologist as directed:  Write down your questions so you remember to ask them during your visits  Wound care:  Carefully wash the wound with soap and water  Dry the area and put on new, clean bandages as directed  Change your bandages when they get wet or dirty  Counseling: This surgery may be life-changing for you and your family  Sudden changes in the levels of your hormones may occur and cause mood swings and depression  You may feel angry, sad, or frightened, or cry frequently and unexpectedly  These feelings are normal  Talk to your caregivers, family, or friends about your feelings   You may need to attend meetings with a caregiver, family members, or other people who are close to you  These meetings can help everyone better understand your condition, surgery, and care  Activity:  Ask when you can return to your usual activities, such as exercise  It is best to start exercise slowly and do more as you get stronger  Exercise makes your heart stronger, lowers blood pressure, and keeps your bones healthy  Contact your surgeon or gynecologist if:   · You have a fever  · You have chills, a cough, or feel weak and achy  · You have nausea or are vomiting  · Your skin is itchy, swollen, or has a rash  · You have questions or concerns about your condition or care  Seek care immediately or call 911 if:   · You feel lightheaded, short of breath, and have chest pain  · You cough up blood  · Your arm or leg feels warm, tender, and painful  It may look swollen and red  · You feel something is bulging into your vagina, or you have vaginal bleeding  · You have lower abdominal or back pain that does not go away even after you take medicine  · You have pus or a foul-smelling odor coming from your vagina  · You have trouble urinating or having a bowel movement  · Blood soaks through your bandage  · Your symptoms return  © 2014 [employer] is for End User's use only and may not be sold, redistributed or otherwise used for commercial purposes  All illustrations and images included in CareNotes® are the copyrighted property of [employer] , [employer]  or [place]  The above information is an  only  It is not intended as medical advice for individual conditions or treatments  Talk to your doctor, nurse or pharmacist before following any medical regimen to see if it is safe and effective for you

## 2017-10-17 NOTE — OP NOTE
OPERATIVE REPORT  PATIENT NAME: Anne Oropeza    :  1965  MRN: [id number]  Pt Location: [facility] OR ROOM 05    SURGERY DATE: 10/17/2017    Surgeon(s) and Role:     * Vicki Nuñez MD - Primary     * Axel Tejeda MD - Assisting    Preop Diagnosis:  Unspecified ovarian cyst, right side [N83 201]    Post-Op Diagnosis Codes:     * Unspecified ovarian cyst, right side [N83 201]    Procedure(s) (LRB):  LAPAROSCOPIC BSO (N/A)    Specimen(s):  ID Type Source Tests Collected by Time Destination   1 : right ovary and fallopian tube Tissue Ovary, Right TISSUE EXAM Vicki Nuñez MD 10/17/2017 0900    2 : left ovary and falloian tube  Tissue Ovary, Left TISSUE EXAM Vicki Nuñez MD 10/17/2017 09        Estimated Blood Loss:   <10cc    Drains:   None    Anesthesia Type:   General    Operative Indications:  Unspecified ovarian cyst, right side [N83 201]      Operative Findings:  Normal appearing external genitalia  Normal appearing cervix  Uterus sounded to 7 cm  Omental adhesions to the anterior abdominal wall  Normal bilateral fallopian tubes and ovaries  Right ovarian cyst approximately 3-4 cm in size    Complications:   None    Procedure Technique:    The patient was taking to the operating room where a timeout was performed to confirm correct patient and correct procedure  The patient was given general anesthesia and was then positioned  on the operating table in the dorsal lithotomy position with legs Supported by stirrups  All pressure points were padded and the Kiera hugger was placed to maintain control of core body temperature  The patient was then prepped and draped in the usual sterile fashion  A straight catheter was used to drain urine from the bladder  A weighted speculum was inserted for visualization of the cervix, which was then grasped with a single-tooth tenaculum  A dilator was then attached to the tenaculum and used for uterine manipulation       Attention was then turned to the abdomen where a 5 mm vertical incision was made in the infraumbilical region and a 5 mm trocar was introduced under direct visualization with the laparoscope within the trocar  The laparoscope was then placed through the trocar and pneumoperitoneum was established, using carbon dioxide  The abdomen was inspected and omental adhesions to the anterior abdominal wall were noted  The uterus ovaries and tubes were identified and a right ovarian cyst appreciated  Two additional 5 mm lateral ports were inserted under direct visualization  As well as a 10 mm suprapubic port to assist with removal of specimen  Attention was turned to the pelvis where the  EnSeal device was used to lyse omental adhesions  The right ovary  was stabilized by grasping the ovarian  tissue with the atraumatic forceps  The ovary and fallopian tube were tented up at the inferior Infundibulum ligament and the EnSeal was placed across this and several bites were taken with good visualization while coagulating and cutting  Attention was then turned to the contralateral adnexa and the same procedure completed  The specimens were placed in an Endo-Catch bag and removed through the 10 mm port without difficulty  Both ovaries and tubes were sent to pathology  The pelvic and abdomen were inspected , and good  hemostasis was confirmed  Pneumoperitoneum was evacuated  The 10 mm port was closed with PDS and  the remaining ports were removed and the incisions was closed using  4-0 Monocryl  Suture and Histoacryl  Attention was then turned to the perineum  The sponge stick  was removed and hemostasis was confirmed  All needles , sponge, instruction count was correct x3 at the end of the procedure  The patient tolerated the procedure well and was transferred to the recovery room in stable condition  Dr Christiano Smyth was present and participate din the entire procedure         Patient Disposition:  PACU     SIGNATURE: Edward Rodriguez MD  DATE: October 17, 2017  TIME: 9:43 AM

## 2017-11-15 ENCOUNTER — LAB REQUISITION (OUTPATIENT)
Dept: LAB | Facility: HOSPITAL | Age: 52
End: 2017-11-15
Payer: COMMERCIAL

## 2017-11-15 ENCOUNTER — GENERIC CONVERSION - ENCOUNTER (OUTPATIENT)
Dept: OTHER | Facility: OTHER | Age: 52
End: 2017-11-15

## 2017-11-15 DIAGNOSIS — J02.9 ACUTE PHARYNGITIS: ICD-10-CM

## 2017-11-15 PROCEDURE — 87070 CULTURE OTHR SPECIMN AEROBIC: CPT | Performed by: FAMILY MEDICINE

## 2017-11-17 LAB — BACTERIA THROAT CULT: NORMAL

## 2018-01-10 NOTE — MISCELLANEOUS
Dear Jose J Brizuela,  We have been notified by the scheduling department that they have tried to reach you to schedule with the specialist   Bert Beams is a copy of the order  Please call them to schedule      Thank you,        Electronically signed by:Ligia Mcdowell OM  Sep 21 2017 10:19AM EST

## 2018-01-12 VITALS
DIASTOLIC BLOOD PRESSURE: 70 MMHG | BODY MASS INDEX: 22.61 KG/M2 | SYSTOLIC BLOOD PRESSURE: 106 MMHG | HEART RATE: 80 BPM | WEIGHT: 104.5 LBS | RESPIRATION RATE: 16 BRPM

## 2018-01-12 VITALS
DIASTOLIC BLOOD PRESSURE: 70 MMHG | SYSTOLIC BLOOD PRESSURE: 134 MMHG | WEIGHT: 107 LBS | BODY MASS INDEX: 23.08 KG/M2 | HEIGHT: 57 IN | HEART RATE: 72 BPM

## 2018-01-13 NOTE — CONSULTS
Chief Complaint  Scheduled for Laparoscopic BSO on 10/17/17 with Dr Ewelina Mason  Saw cardiology yesterday and has ECHO and Stress test scheduled for tomorrow before cardiac clearance is given by Dr Wil Loaiza  History of Present Illness  Pre-Op Visit (Brief): The patient is being seen for a preoperative visit  The procedure is a(n) Bilateral oophorectomy scheduled for 10/17 with Dr Ewelina Mason  The indication for surgery is chocolate cysts  Surgical Risk Assessment:   Prior Anesthesia: She had prior anesthesia, no prior adverse reaction to edidural anesthesia, no prior adverse reaction to spinal anesthesia, no prior adverse reaction to general anesthesia and Nausea and vomiting  Pertinent Past Medical History: no pertinent past medical history  Exercise Capacity: unable to walk four blocks without symptoms and unable to walk two flights of stairs without symptoms  Lifestyle Factors: denies alcohol use, denies tobacco use and denies illegal drug use  Symptoms: no easy bleeding, easy bruising, no frequent nosebleeds, no chest pain, no cough, no dyspnea, no edema, no palpitations and no wheezing  Other JAZMINE risk factors include age over 48, but normal BMI, female gender and normal neck circumference  Predicted risk of JAZMINE: None  Pertinent Family History: Mother had stroke, but no family history of an adverse reaction to anesthesia, no aneurysm, no bleeding problems, no sudden early deaths, no ischemic heart disease and no stroke  Living Situation: home is secure and supportive  HPI: Gillian Katz saw cardiology today for her heart murmur  Getting a stress test and echocardiogram tomorrow  She hopefully will then get cardiac clearance  Review of Systems    Cardiovascular: as noted in HPI  Respiratory: as noted in HPI  Gastrointestinal: No complaints of abdominal pain, no constipation, no nausea or vomiting, no diarrhea, no bloody stools     Genitourinary: No complaints of dysuria, no incontinence, no pelvic pain, no dysmenorrhea, no vaginal discharge or bleeding  Active Problems    1  Abdominal pain (789 00) (R10 9)   2  Abnormal EKG (794 31) (R94 31)   3  Acute sinusitis (461 9) (J01 90)   4  Acute URI (465 9) (J06 9)   5  Allergic rhinitis (477 9) (J30 9)   6  Anxiety (300 00) (F41 9)   7  Bronchitis (490) (J40)   8  Cardiac murmur (785 2) (R01 1)   9  Chest wall injury (959 11) (S29 9XXA)   10  Chronic depressive disorder (301 12) (F32 9)   11  Colon cancer screening (V76 51) (Z12 11)   12  Complete Colonoscopy   13  Cough (786 2) (R05)   14  Elbow pain (719 42) (M25 529)   15  Fatigue (780 79) (R53 83)   16  Hyperlipidemia (272 4) (E78 5)   17  Left shoulder pain (719 41) (M25 512)   18  Lower back pain (724 2) (M54 5)   19  Muscle strain of chest wall (848 8) (S29 011A)   20  Need for influenza vaccination (V04 81) (Z23)   21  Pharyngitis (462) (J02 9)   22  Preoperative cardiovascular examination (V72 81) (Z01 810)   23  Rosacea (695 3) (L71 9)   24  Sacroiliitis (720 2) (M46 1)   25  Vitamin D deficiency (268 9) (E55 9)   26  Well adult on routine health check (V70 0) (Z00 00)    Past Medical History    · History of Denial Of Any Significant Medical History   · Former smoker (V15 82) (Q02 893)   · History of Influenza vaccination administered during current admission (V04 81) (Z23)    Surgical History    · History of  Section   · History of Exploratory Laparoscopy   · History of Throat Surgery    Family History    · Family history of Mother  At Age 72   · Family history of Stroke Syndrome (V17 1)    · Family history of Cancer   · Family history of Father  At Age 79    Social History    · Being A Social Drinker   · Caffeine Use   · Former smoker (Y89 64) (L08 819)    Current Meds   1  Caltrate 600 Plus-Vit D TABS; Therapy: (Recorded:2017) to Recorded   2  Citalopram Hydrobromide 10 MG Oral Tablet; TAKE 1 TABLET DAILY;    Therapy: 79BQM7362 to (Anny Haddad)  Requested for: 23ZTM4038; Last   Rx:[id number] Ordered    Allergies    1  Morphine Sulfate SOLN   2  Nafcillin Sodium SOLR   3  Penicillins   4  Septra TABS    Physical Exam    Constitutional   General appearance: No acute distress, well appearing and well nourished  Head and Face   Head and face: Normal     Palpation of the face and sinuses: No sinus tenderness  Eyes   Conjunctiva and lids: No swelling, erythema or discharge  Pupils and irises: Equal, round, reactive to light  Ears, Nose, Mouth, and Throat   Otoscopic examination: Tympanic membranes translucent with normal light reflex  Canals patent without erythema  Lips, teeth, and gums: Normal, good dentition  Oropharynx: Normal with no erythema, edema, exudate or lesions  Neck   Neck: Supple, symmetric, trachea midline, no masses  Thyroid: Normal, no thyromegaly  Pulmonary   Respiratory effort: No increased work of breathing or signs of respiratory distress  Auscultation of lungs: Clear to auscultation  Cardiovascular   Auscultation of heart: Abnormal   Systolic murmur at both sternal borders and apex  Grad 2/6  Carotid pulses: 2+ bilaterally  Abdominal aorta: Normal     Femoral pulses: 2+ bilaterally  Pedal pulses: 2+ bilaterally  Peripheral vascular exam: Normal     Examination of extremities for edema and/or varicosities: Normal     Abdomen   Abdomen: Non-tender, no masses  Liver and spleen: No hepatomegaly or splenomegaly  Lymphatic   Palpation of lymph nodes in neck: No lymphadenopathy  Palpation of lymph nodes in groin: No lymphadenopathy  Musculoskeletal   Gait and station: Normal     Psychiatric   Judgment and insight: Normal     Mood and affect: Normal        Results/Data  EKG/CBC WNL        Assessment    1  Ovarian cyst (620 2) (N83 209)    Plan    Medically cleared for surgery pending cardiac clearance  End of Encounter Meds    1  Citalopram Hydrobromide 10 MG Oral Tablet; TAKE 1 TABLET DAILY;    Therapy: 74AEO5605 to (Evaluate:26Mar2018)  Requested for: 37ANO2456; Last   Rx:[id number] Ordered    2  Caltrate 600 Plus-Vit D TABS;    Therapy: (Recorded:12Oct2017) to Recorded    Signatures   Electronically signed by : DANIEL Mccann ; Oct 12 2017 10:48PM EST                       (Author)

## 2018-01-15 NOTE — PROGRESS NOTES
Assessment    1  Cardiac murmur (785 2) (R01 1)   2  Anxiety (300 00) (F41 9)   3  Chronic depressive disorder (301 12) (F32 9)   4  Vitamin D deficiency (268 9) (E55 9)   5  Encounter for preventive health examination (V70 0) (Z00 00)    Plan  Health Maintenance    · Begin or continue regular aerobic exercise  Gradually work up to at least 3 sessions of 30  minutes of exercise a week ; Status:Complete;   Done: 29GIW4273    As above  REcheck 6 months     Discussion/Summary    1  HM-up to date  Advised Tdap, Caltrate plus D   2  URI-advised AFrin or Sudafed prn     3  Cerumen impaction-will use softener at home  4  Anxiety-still some irritability; will increase citalopram to 20 mg   5  Vit D deficiency-mild-will take Caltrate plus D    6  Cardiac murmur-ECHO in 2015 showed no significant abnormalities  Will do f/u  History of Present Illness  HM, Adult Female: The patient is being seen for a health maintenance evaluation  General Health: The patient's health since the last visit is described as good  She has regular dental visits  (No recent dental checkup)   She complains of vision problems  Vision care includes an eye examination within the last year and dianboom care  She denies hearing loss  Lifestyle:  She consumes a diverse and healthy diet  (Eats fruits and vegetables  Eats white bread  Eating protein daily  Dietary calcium 1 daily  Caffeine 2 daily)   She does not have any weight concerns  She exercises regularly  (Walks occ  )   She does not use tobacco  She denies alcohol use  Screening:   HPI: Sofi Blood says her citalopram is working well and wants to continue  Sleeping is good  Mood Motivation good    More irritability and getting into more disagreements with her family  She complains of congestion for the past several days  Has pain right side of face  Very stuffy  No fever or chills  Patient says she 's been told she had a heart murmur since childhood but no abnormalities  Active Problems    1  Abdominal pain (789 00) (R10 9)   2  Acute sinusitis (461 9) (J01 90)   3  Acute URI (465 9) (J06 9)   4  Allergic rhinitis (477 9) (J30 9)   5  Anxiety (300 00) (F41 9)   6  Bronchitis (490) (J40)   7  Cardiac murmur (785 2) (R01 1)   8  Chest wall injury (959 11) (S29 9XXA)   9  Chronic depressive disorder (301 12) (F32 9)   10  Colon cancer screening (V76 51) (Z12 11)   11  Cough (786 2) (R05)   12  Elbow pain (719 42) (M25 529)   13  Fatigue (780 79) (R53 83)   14  Hyperlipidemia (272 4) (E78 5)   15  Left shoulder pain (719 41) (M25 512)   16  Lower back pain (724 2) (M54 5)   17  Muscle strain of chest wall (848 8) (S29 011A)   18  Need for influenza vaccination (V04 81) (Z23)   19  Pharyngitis (462) (J02 9)   20  Rosacea (695 3) (L71 9)   21  Sacroiliitis (720 2) (M46 1)   22  Vitamin D deficiency (268 9) (E55 9)   23  Well adult on routine health check (V70 0) (Z00 00)    Past Medical History    · History of Denial Of Any Significant Medical History   · Former smoker (V15 82) (Q32 562)   · History of Influenza vaccination administered during current admission (V04 81) (Z23)    Surgical History    · History of  Section   · History of Exploratory Laparoscopy   · History of Throat Surgery    Family History  Mother    · Family history of Mother  At Age 72   · Family history of Stroke Syndrome (V17 1)  Father    · Family history of Cancer   · Family history of Father  At Age 79    The family history was reviewed and updated today  Social History    · Being A Social Drinker   · Caffeine Use   · Former smoker (S72 62) (O19 768)    Current Meds   1  Citalopram Hydrobromide 10 MG Oral Tablet; TAKE 1 TABLET DAILY; Therapy: 77IYE5827 to (26 090 135)  Requested for: 2017; Last   Rx:[id number] Ordered   2  Ibuprofen 600 MG Oral Tablet; TAKE 1 TABLET 3 TIMES DAILY AS NEEDED;    Therapy: 52CRP5363 to (Evaluate:44Xbs1351)  Requested for: 59IIG8027; Last Rx: [id number] Ordered    Allergies    1  Morphine Sulfate SOLN   2  Nafcillin Sodium SOLR   3  Penicillins   4  Septra TABS    Vitals   Recorded: 33Zml3191 12:02PM   Heart Rate 68   Respiration 16   Systolic 556   Diastolic 68   Weight 928 lb 2 oz   BMI Calculated 22 93   BSA Calculated 1 35     Physical Exam    Constitutional   General appearance: No acute distress, well appearing and well nourished  Head and Face   Head and face: Normal     Eyes   Conjunctiva and lids: No swelling, erythema or discharge  Ears, Nose, Mouth, and Throat   Otoscopic examination: Tympanic membranes translucent with normal light reflex  Canals patent without erythema  Left TM occluded by cerumen  Oropharynx: Normal with no erythema, edema, exudate or lesions  Neck   Neck: Supple, symmetric, trachea midline, no masses  Thyroid: Normal, no thyromegaly  Pulmonary   Respiratory effort: No increased work of breathing or signs of respiratory distress  Auscultation of lungs: Clear to auscultation  Cardiovascular   Auscultation of heart: Normal rate and rhythm, normal S1 and S2, no murmurs  Carotid pulses: 2+ bilaterally  Abdominal aorta: Normal     Femoral pulses: 2+ bilaterally  Pedal pulses: 2+ bilaterally  Peripheral vascular exam: Normal     Examination of extremities for edema and/or varicosities: Normal     Abdomen   Abdomen: Non-tender, no masses  Liver and spleen: No hepatomegaly or splenomegaly  Lymphatic   Palpation of lymph nodes in neck: No lymphadenopathy  Palpation of lymph nodes in groin: No lymphadenopathy  Musculoskeletal   Gait and station: Normal     Psychiatric   Judgment and insight: Normal        Future Appointments    Date/Time Provider Specialty Site   02/28/2018 10:00 AM DANIEL Courtney   [address] [facility]     Signatures   Electronically signed by : DANIEL Arguello ; Aug 24 2017  2:15PM EST                       (Author)

## 2018-01-22 VITALS
BODY MASS INDEX: 22.87 KG/M2 | WEIGHT: 106 LBS | HEIGHT: 57 IN | RESPIRATION RATE: 16 BRPM | DIASTOLIC BLOOD PRESSURE: 70 MMHG | SYSTOLIC BLOOD PRESSURE: 100 MMHG | HEART RATE: 80 BPM | TEMPERATURE: 98.1 F

## 2018-01-22 VITALS
SYSTOLIC BLOOD PRESSURE: 124 MMHG | BODY MASS INDEX: 22.93 KG/M2 | DIASTOLIC BLOOD PRESSURE: 68 MMHG | WEIGHT: 104.13 LBS | HEART RATE: 68 BPM | RESPIRATION RATE: 16 BRPM

## 2018-02-05 ENCOUNTER — TRANSCRIBE ORDERS (OUTPATIENT)
Dept: ADMINISTRATIVE | Facility: HOSPITAL | Age: 53
End: 2018-02-05

## 2018-02-05 DIAGNOSIS — H90.3 SENSORINEURAL HEARING LOSS (SNHL) OF BOTH EARS: Primary | ICD-10-CM

## 2018-03-05 ENCOUNTER — OFFICE VISIT (OUTPATIENT)
Dept: FAMILY MEDICINE CLINIC | Facility: MEDICAL CENTER | Age: 53
End: 2018-03-05
Payer: COMMERCIAL

## 2018-03-05 VITALS
HEART RATE: 64 BPM | RESPIRATION RATE: 16 BRPM | DIASTOLIC BLOOD PRESSURE: 70 MMHG | SYSTOLIC BLOOD PRESSURE: 120 MMHG | WEIGHT: 112.4 LBS | BODY MASS INDEX: 24.76 KG/M2

## 2018-03-05 DIAGNOSIS — F41.9 ANXIETY: Primary | ICD-10-CM

## 2018-03-05 DIAGNOSIS — F32.A CHRONIC DEPRESSIVE DISORDER: ICD-10-CM

## 2018-03-05 PROCEDURE — 99213 OFFICE O/P EST LOW 20 MIN: CPT | Performed by: FAMILY MEDICINE

## 2018-03-05 RX ORDER — ALPRAZOLAM 0.25 MG/1
0.25 TABLET ORAL 2 TIMES DAILY PRN
Qty: 30 TABLET | Refills: 0 | Status: SHIPPED | OUTPATIENT
Start: 2018-03-05 | End: 2018-05-17 | Stop reason: SDUPTHER

## 2018-03-05 NOTE — PROGRESS NOTES
Tara Shah says she stopped the citalopram because of weight gain and feels like it was no helping  She has been having panic attacks since driving  Doesn't occur at other times  Doesn't matter who is driving  No prior history   says she  is more irritable but she does not think it is any more than normally  Mood is good overall as well as motivation  Going to school  O: /70 (Cuff Size: Standard)   Pulse 64   Resp 16   Wt 51 kg (112 lb 6 4 oz)   BMI 24 76 kg/m²   Weight up 8 lbs  Affect is normal      Assessment:  1  Depression-so far doing reasonably well without SSRI  Will monitor  2  Panic disorder-discussed options  Suggested therapy; Reviewed relaxation techniques and encouraged exercise    Since infrequent will give Rx for alprazolam as needed  Appropriate use reviewed  Could consider use of another SSRI; weight  gain ain issue  Plan:   Rx for alprazolam  CPX in 6 months

## 2018-05-17 DIAGNOSIS — F41.9 ANXIETY: ICD-10-CM

## 2018-05-20 RX ORDER — ALPRAZOLAM 0.25 MG/1
0.25 TABLET ORAL 2 TIMES DAILY PRN
Qty: 30 TABLET | Refills: 0 | OUTPATIENT
Start: 2018-05-20 | End: 2018-09-24 | Stop reason: SDUPTHER

## 2018-06-06 ENCOUNTER — TRANSCRIBE ORDERS (OUTPATIENT)
Dept: ADMINISTRATIVE | Facility: HOSPITAL | Age: 53
End: 2018-06-06

## 2018-06-06 DIAGNOSIS — Z12.31 VISIT FOR SCREENING MAMMOGRAM: Primary | ICD-10-CM

## 2018-06-11 ENCOUNTER — HOSPITAL ENCOUNTER (OUTPATIENT)
Dept: RADIOLOGY | Age: 53
Discharge: HOME | End: 2018-06-11
Payer: COMMERCIAL

## 2018-06-11 DIAGNOSIS — Z12.31 VISIT FOR SCREENING MAMMOGRAM: ICD-10-CM

## 2018-06-11 PROCEDURE — 77063 BREAST TOMOSYNTHESIS BI: CPT

## 2018-06-11 PROCEDURE — 77067 SCR MAMMO BI INCL CAD: CPT

## 2018-09-24 DIAGNOSIS — F41.9 ANXIETY: ICD-10-CM

## 2018-09-25 RX ORDER — ALPRAZOLAM 0.25 MG/1
0.25 TABLET ORAL 2 TIMES DAILY PRN
Qty: 30 TABLET | Refills: 0 | OUTPATIENT
Start: 2018-09-25 | End: 2019-05-16 | Stop reason: SDUPTHER

## 2018-11-29 ENCOUNTER — OFFICE VISIT (OUTPATIENT)
Dept: FAMILY MEDICINE CLINIC | Facility: MEDICAL CENTER | Age: 53
End: 2018-11-29
Payer: COMMERCIAL

## 2018-11-29 VITALS
DIASTOLIC BLOOD PRESSURE: 80 MMHG | HEIGHT: 57 IN | BODY MASS INDEX: 24.16 KG/M2 | WEIGHT: 112 LBS | SYSTOLIC BLOOD PRESSURE: 140 MMHG | RESPIRATION RATE: 16 BRPM | HEART RATE: 84 BPM

## 2018-11-29 DIAGNOSIS — Z00.00 ROUTINE ADULT HEALTH MAINTENANCE: ICD-10-CM

## 2018-11-29 DIAGNOSIS — E78.01 FAMILIAL HYPERCHOLESTEROLEMIA: Primary | ICD-10-CM

## 2018-11-29 DIAGNOSIS — F32.A CHRONIC DEPRESSIVE DISORDER: ICD-10-CM

## 2018-11-29 DIAGNOSIS — Z80.3 FAMILY HISTORY OF BREAST CANCER IN SISTER: ICD-10-CM

## 2018-11-29 DIAGNOSIS — Z23 NEED FOR IMMUNIZATION AGAINST INFLUENZA: ICD-10-CM

## 2018-11-29 DIAGNOSIS — E55.9 VITAMIN D DEFICIENCY: ICD-10-CM

## 2018-11-29 PROCEDURE — 90471 IMMUNIZATION ADMIN: CPT

## 2018-11-29 PROCEDURE — 99396 PREV VISIT EST AGE 40-64: CPT | Performed by: FAMILY MEDICINE

## 2018-11-29 PROCEDURE — 90682 RIV4 VACC RECOMBINANT DNA IM: CPT

## 2018-11-30 NOTE — PROGRESS NOTES
Annmarie Perez is here for CPX  She  is doing well  Has been very busy   Going back to school for     HH: She says her diet is not good  Eating more salt  Exercise walks the dog  Dietary calcium not much  Caffeine 2 coffee daily  Alcohol several glasses of wine at night  FH: unchanged  Father with  lymph cancer? Sister with breast cancer  SH: Lives with  and children; going back to school  She says her left shoulder hurts  Also clicks  Bad when she wakes up   Patient says she has of said tonight which is why her blood pressure was up  She said she saw cardiology last year for her follow-up of her systolic murmur  She said she was told she did not need to be seen any further  Echo was normal in 2015 and in 2017  stress echo was normal  I have reviewed the patient's medical history in detail and updated the computerized patient record  Gyn checkup Dr Keely Philip 2018  Mammogram 2018  Colonoscopy 2015  10 year f/u    O: /88 (Cuff Size: Standard)   Pulse 84   Resp 16   Ht 4' 8 5" (1 435 m)   Wt 50 8 kg (112 lb)   BMI 24 67 kg/m²   BP by me 140/80  Physical Exam   Constitutional: She is oriented to person, place, and time  She appears well-developed and well-nourished  HENT:   Head: Normocephalic  Right Ear: External ear normal    Left Ear: External ear normal    Nose: Nose normal    Mouth/Throat: Oropharynx is clear and moist    Eyes: Pupils are equal, round, and reactive to light  Conjunctivae and EOM are normal  No scleral icterus  Neck: Normal range of motion  Neck supple  Carotid bruit is not present  No thyroid mass and no thyromegaly present  Cardiovascular: Normal rate, regular rhythm, normal heart sounds and intact distal pulses  Pulses:       Femoral pulses are 2+ on the right side, and 2+ on the left side  Popliteal pulses are 2+ on the right side, and 2+ on the left side  Dorsalis pedis pulses are 2+ on the right side, and 2+ on the left side  Posterior tibial pulses are 2+ on the right side, and 2+ on the left side  Systolic murmur 1-8/4 at upper sternal border   Pulmonary/Chest: Effort normal and breath sounds normal  No respiratory distress  She has no wheezes  She has no rales  Abdominal: Soft  Normal aorta and bowel sounds are normal  She exhibits no distension and no mass  There is no hepatosplenomegaly  There is no tenderness  Musculoskeletal: Normal range of motion  She exhibits no edema  Lymphadenopathy:        Head (right side): No submandibular and no occipital adenopathy present  Head (left side): No submandibular and no occipital adenopathy present  She has no cervical adenopathy  Right: No inguinal and no supraclavicular adenopathy present  Left: No inguinal and no supraclavicular adenopathy present  Neurological: She is oriented to person, place, and time  Skin: No lesion and no rash noted  Psychiatric: She has a normal mood and affect  Her behavior is normal      Assessment  1  HM-immunizations updated  2  Elevated BP-probably stress related  Advised to watch her sodium intake  Continue to   3  FH breast cancer-requests genetic counselling  4  Systolic murmur -has had workup    Plan  Flu shot  Check BW    Genetic counselling referral   Recheck 6 months

## 2018-12-18 ENCOUNTER — APPOINTMENT (OUTPATIENT)
Dept: LAB | Facility: MEDICAL CENTER | Age: 53
End: 2018-12-18
Payer: COMMERCIAL

## 2018-12-18 DIAGNOSIS — E78.01 FAMILIAL HYPERCHOLESTEROLEMIA: ICD-10-CM

## 2018-12-18 DIAGNOSIS — E55.9 VITAMIN D DEFICIENCY: ICD-10-CM

## 2018-12-18 DIAGNOSIS — F32.A CHRONIC DEPRESSIVE DISORDER: ICD-10-CM

## 2018-12-18 LAB
25(OH)D3 SERPL-MCNC: 15.4 NG/ML (ref 30–100)
ALBUMIN SERPL BCP-MCNC: 4.3 G/DL (ref 3.5–5)
ALP SERPL-CCNC: 87 U/L (ref 46–116)
ALT SERPL W P-5'-P-CCNC: 27 U/L (ref 12–78)
ANION GAP SERPL CALCULATED.3IONS-SCNC: 6 MMOL/L (ref 4–13)
AST SERPL W P-5'-P-CCNC: 21 U/L (ref 5–45)
BASOPHILS # BLD AUTO: 0.05 THOUSANDS/ΜL (ref 0–0.1)
BASOPHILS NFR BLD AUTO: 1 % (ref 0–1)
BILIRUB SERPL-MCNC: 0.62 MG/DL (ref 0.2–1)
BUN SERPL-MCNC: 15 MG/DL (ref 5–25)
CALCIUM SERPL-MCNC: 8.9 MG/DL (ref 8.3–10.1)
CHLORIDE SERPL-SCNC: 105 MMOL/L (ref 100–108)
CHOLEST SERPL-MCNC: 266 MG/DL (ref 50–200)
CO2 SERPL-SCNC: 27 MMOL/L (ref 21–32)
CREAT SERPL-MCNC: 0.69 MG/DL (ref 0.6–1.3)
EOSINOPHIL # BLD AUTO: 0.07 THOUSAND/ΜL (ref 0–0.61)
EOSINOPHIL NFR BLD AUTO: 1 % (ref 0–6)
ERYTHROCYTE [DISTWIDTH] IN BLOOD BY AUTOMATED COUNT: 13.7 % (ref 11.6–15.1)
GFR SERPL CREATININE-BSD FRML MDRD: 100 ML/MIN/1.73SQ M
GLUCOSE P FAST SERPL-MCNC: 75 MG/DL (ref 65–99)
HCT VFR BLD AUTO: 43.4 % (ref 34.8–46.1)
HDLC SERPL-MCNC: 57 MG/DL (ref 40–60)
HGB BLD-MCNC: 13.6 G/DL (ref 11.5–15.4)
IMM GRANULOCYTES # BLD AUTO: 0.02 THOUSAND/UL (ref 0–0.2)
IMM GRANULOCYTES NFR BLD AUTO: 0 % (ref 0–2)
LDLC SERPL CALC-MCNC: 177 MG/DL (ref 0–100)
LYMPHOCYTES # BLD AUTO: 2.42 THOUSANDS/ΜL (ref 0.6–4.47)
LYMPHOCYTES NFR BLD AUTO: 35 % (ref 14–44)
MCH RBC QN AUTO: 29.4 PG (ref 26.8–34.3)
MCHC RBC AUTO-ENTMCNC: 31.3 G/DL (ref 31.4–37.4)
MCV RBC AUTO: 94 FL (ref 82–98)
MONOCYTES # BLD AUTO: 0.52 THOUSAND/ΜL (ref 0.17–1.22)
MONOCYTES NFR BLD AUTO: 8 % (ref 4–12)
NEUTROPHILS # BLD AUTO: 3.86 THOUSANDS/ΜL (ref 1.85–7.62)
NEUTS SEG NFR BLD AUTO: 55 % (ref 43–75)
NONHDLC SERPL-MCNC: 209 MG/DL
NRBC BLD AUTO-RTO: 0 /100 WBCS
PLATELET # BLD AUTO: 214 THOUSANDS/UL (ref 149–390)
PMV BLD AUTO: 10.6 FL (ref 8.9–12.7)
POTASSIUM SERPL-SCNC: 4 MMOL/L (ref 3.5–5.3)
PROT SERPL-MCNC: 8.5 G/DL (ref 6.4–8.2)
RBC # BLD AUTO: 4.63 MILLION/UL (ref 3.81–5.12)
SODIUM SERPL-SCNC: 138 MMOL/L (ref 136–145)
TRIGL SERPL-MCNC: 160 MG/DL
WBC # BLD AUTO: 6.94 THOUSAND/UL (ref 4.31–10.16)

## 2018-12-18 PROCEDURE — 80053 COMPREHEN METABOLIC PANEL: CPT

## 2018-12-18 PROCEDURE — 80061 LIPID PANEL: CPT

## 2018-12-18 PROCEDURE — 36415 COLL VENOUS BLD VENIPUNCTURE: CPT

## 2018-12-18 PROCEDURE — 82306 VITAMIN D 25 HYDROXY: CPT

## 2018-12-18 PROCEDURE — 85025 COMPLETE CBC W/AUTO DIFF WBC: CPT

## 2018-12-18 PROCEDURE — 86800 THYROGLOBULIN ANTIBODY: CPT

## 2018-12-18 PROCEDURE — 86376 MICROSOMAL ANTIBODY EACH: CPT

## 2018-12-19 LAB
THYROGLOB AB SERPL-ACNC: 1 IU/ML (ref 0–0.9)
THYROPEROXIDASE AB SERPL-ACNC: 10 IU/ML (ref 0–34)

## 2018-12-20 DIAGNOSIS — E55.9 VITAMIN D DEFICIENCY: Primary | ICD-10-CM

## 2018-12-20 RX ORDER — ERGOCALCIFEROL 1.25 MG/1
50000 CAPSULE ORAL WEEKLY
Qty: 12 CAPSULE | Refills: 0 | Status: SHIPPED | OUTPATIENT
Start: 2018-12-20 | End: 2020-09-03 | Stop reason: ALTCHOICE

## 2018-12-21 ENCOUNTER — TELEPHONE (OUTPATIENT)
Dept: FAMILY MEDICINE CLINIC | Facility: MEDICAL CENTER | Age: 53
End: 2018-12-21

## 2018-12-21 DIAGNOSIS — R79.89 ABNORMAL THYROID BLOOD TEST: Primary | ICD-10-CM

## 2018-12-21 NOTE — TELEPHONE ENCOUNTER
----- Message from Shweta Dong MD sent at 12/20/2018  9:35 PM EST -----  Notify of blood work results  All is good except for :  1  her lipid profile which shows a cholesterol higher than it has been at 266  Really needs to watch her diet and repeat knee year  2  Vitamin-D is low-she should have vitamin-D replacement with prescription for 3 months followed by vitamin D3 2000 units daily  This is likely been done in the past but she did not follow through with vitamin D3 2000 units  Emphasized the importance of doing this  Vitamin-D Rx sent to her pharmacy  3  Thyroid level was not done  Needs a TSH  Does not have to be fasting

## 2018-12-26 ENCOUNTER — TELEPHONE (OUTPATIENT)
Dept: FAMILY MEDICINE CLINIC | Facility: MEDICAL CENTER | Age: 53
End: 2018-12-26

## 2018-12-26 ENCOUNTER — OFFICE VISIT (OUTPATIENT)
Dept: FAMILY MEDICINE CLINIC | Facility: MEDICAL CENTER | Age: 53
End: 2018-12-26
Payer: COMMERCIAL

## 2018-12-26 VITALS
SYSTOLIC BLOOD PRESSURE: 130 MMHG | BODY MASS INDEX: 23.79 KG/M2 | DIASTOLIC BLOOD PRESSURE: 84 MMHG | TEMPERATURE: 98.4 F | WEIGHT: 108 LBS | HEART RATE: 86 BPM | OXYGEN SATURATION: 98 %

## 2018-12-26 DIAGNOSIS — J31.0 RHINOSINUSITIS: Primary | ICD-10-CM

## 2018-12-26 DIAGNOSIS — J32.9 RHINOSINUSITIS: Primary | ICD-10-CM

## 2018-12-26 PROCEDURE — 99213 OFFICE O/P EST LOW 20 MIN: CPT | Performed by: FAMILY MEDICINE

## 2018-12-26 RX ORDER — CEFPROZIL 500 MG/1
500 TABLET, FILM COATED ORAL 2 TIMES DAILY
Qty: 20 TABLET | Refills: 0 | Status: SHIPPED | OUTPATIENT
Start: 2018-12-26 | End: 2019-01-05

## 2018-12-26 RX ORDER — BENZONATATE 200 MG/1
200 CAPSULE ORAL 3 TIMES DAILY PRN
Qty: 21 CAPSULE | Refills: 0 | Status: SHIPPED | OUTPATIENT
Start: 2018-12-26 | End: 2019-01-02

## 2018-12-26 NOTE — TELEPHONE ENCOUNTER
[facility]-   pt left a VM she has cold symptoms  Fever last week that broke now but still with congestion  Per Dr Eugenia Sanon    triage

## 2018-12-26 NOTE — TELEPHONE ENCOUNTER
Coughing and wheezing x one week  Had a fever last week  Did not take anything OTC  Thinks she has a sinus infection   appt this afternoon at 1:30 with Dr Nolvia Vega

## 2018-12-27 ENCOUNTER — CONSULT (OUTPATIENT)
Dept: GYNECOLOGIC ONCOLOGY | Facility: CLINIC | Age: 53
End: 2018-12-27
Payer: COMMERCIAL

## 2018-12-27 VITALS
DIASTOLIC BLOOD PRESSURE: 78 MMHG | BODY MASS INDEX: 23.26 KG/M2 | TEMPERATURE: 98.3 F | WEIGHT: 107.8 LBS | HEART RATE: 82 BPM | RESPIRATION RATE: 21 BRPM | SYSTOLIC BLOOD PRESSURE: 122 MMHG | HEIGHT: 57 IN

## 2018-12-27 DIAGNOSIS — Z13.79 GENETIC TESTING: Primary | ICD-10-CM

## 2018-12-27 PROCEDURE — 99202 OFFICE O/P NEW SF 15 MIN: CPT | Performed by: NURSE PRACTITIONER

## 2018-12-27 NOTE — ASSESSMENT & PLAN NOTE
We discussed risk and benefits of genetic testing  She understands the possible outcomes of testing, including no pathogenic mutation, a positive pathogenic mutation, and variant of undetermined signficance (VUS)  We discussed surveillance and prophylactic measures in the event of a positive finding  We discussed implications for the patient's first degree family members if a pathogenic mutation is identified  The patient verbalizes understanding and agrees to proceed with testing  A blood sample was collected and will be sent to [facility] for processing  She understands that she will be contacted by the company in the event her OOP co-payment exceeds $100  She has elected to have her results disclosed over the phone when available in approximately 2-4 weeks  In the event of a positive finding, the patient will be referred for formal genetic counseling, and any other appropriate referrals will also be made      20 minutes were spent in the care of this patient  Greater than 50% of the visit was spent in counseling and discussion of risks, benefits, and outcomes of genetic testing

## 2018-12-27 NOTE — PROGRESS NOTES
Selene Nobles  1965  [facility]  [facility] [place]  [address] [address] [phone]    Chief Complaint   Patient presents with    Consult     Genetic Testing       Assessment/Plan       1  Genetic testing       We discussed risk and benefits of genetic testing  She understands the possible outcomes of testing, including no pathogenic mutation, a positive pathogenic mutation, and variant of undetermined signficance (VUS)  We discussed surveillance and prophylactic measures in the event of a positive finding  We discussed implications for the patient's first degree family members if a pathogenic mutation is identified  The patient verbalizes understanding and agrees to proceed with testing  A blood sample was collected and will be sent to [facility] for processing  She understands that she will be contacted by the company in the event her OOP co-payment exceeds $100  She has elected to have her results disclosed over the phone when available in approximately 2-4 weeks  In the event of a positive finding, the patient will be referred for formal genetic counseling, and any other appropriate referrals will also be made      20 minutes were spent in the care of this patient  Greater than 50% of the visit was spent in counseling and discussion of risks, benefits, and outcomes of genetic testing  Cancer Staging  N/A      History of Present Illness: This is a 54-year-old who presents for genetic testing  She has no personal history of cancer  She has a family history of cancer in her sister, who was diagnosed with breast cancer at age 36, and passed away at age 48  Additionally, her father had lymphoma  No one in her family has had genetic testing  She had a laparascopic BSO in the past for an ovarian cyst  She has mammograms annually  She is feeling well and is without acute complaints          Review of Systems   Constitutional: Negative for chills, fatigue, fever and unexpected weight change  HENT: Negative for nosebleeds  Eyes: Negative  Respiratory: Negative for cough, chest tightness, shortness of breath and wheezing  Cardiovascular: Negative for chest pain, palpitations and leg swelling  Gastrointestinal: Negative for abdominal distention, abdominal pain, anal bleeding, blood in stool, constipation, diarrhea, nausea, rectal pain and vomiting  Endocrine: Negative  Genitourinary: Negative for difficulty urinating, dysuria, frequency, hematuria, pelvic pain, urgency, vaginal bleeding, vaginal discharge and vaginal pain  Musculoskeletal: Negative for arthralgias and joint swelling  Skin: Negative for color change, pallor and rash  Neurological: Negative for dizziness, weakness, light-headedness, numbness and headaches  Hematological: Negative  Psychiatric/Behavioral: Negative  Past Medical History:   Diagnosis Date    Murmur     MVP (mitral valve prolapse)     PONV (postoperative nausea and vomiting)        Past Surgical History:   Procedure Laterality Date    APPENDECTOMY       SECTION  2004    EXPLORATORY LAPAROTOMY      LAPAROSCOPY      Exploratory    VT LAP,RMV  ADNEXAL STRUCTURE N/A 10/17/2017    Procedure: LAPAROSCOPIC BSO;  Surgeon: Den Mccarty MD;  Location: [facility];  Service: Gynecology    THROAT SURGERY      Resolved: 56    THYROID SURGERY      THYROID SURGERY      goiter       OB History     No data available          Family History   Problem Relation Age of Onset    Stroke Mother         Syndrome    Cancer Father        Social History     Social History    Marital status: /Civil Union     Spouse name: N/A    Number of children: N/A    Years of education: N/A     Occupational History    Not on file       Social History Main Topics    Smoking status: Never Smoker    Smokeless tobacco: Never Used      Comment: Former smoker per allscripts resolved in     Alcohol use 0 6 oz/week     1 Glasses of wine per week      Comment: social    Drug use: No    Sexual activity: Not on file     Other Topics Concern    Not on file     Social History Narrative    Caffeine use             Current Outpatient Prescriptions:     ALPRAZolam (XANAX) 0 25 mg tablet, Take 1 tablet (0 25 mg total) by mouth 2 (two) times a day as needed for anxiety, Disp: 30 tablet, Rfl: 0    benzonatate (TESSALON) 200 MG capsule, Take 1 capsule (200 mg total) by mouth 3 (three) times a day as needed for cough for up to 7 days, Disp: 21 capsule, Rfl: 0    cefprosil (CEFZIL) 500 MG tablet, Take 1 tablet (500 mg total) by mouth 2 (two) times a day for 10 days, Disp: 20 tablet, Rfl: 0    ergocalciferol (VITAMIN D2) 50,000 units, Take 1 capsule (50,000 Units total) by mouth once a week, Disp: 12 capsule, Rfl: 0    Allergies   Allergen Reactions    Erythromycin     Morphine GI Intolerance and Nausea Only     nausea    Nafcillin     Penicillins     Septra [Sulfamethoxazole-Trimethoprim]        Physical Exam   Constitutional: She is oriented to person, place, and time  She appears well-developed and well-nourished  No distress  HENT:   Head: Normocephalic and atraumatic  Pulmonary/Chest: Effort normal  No respiratory distress  Musculoskeletal: Normal range of motion  Neurological: She is alert and oriented to person, place, and time  Skin: Skin is warm and dry  No rash noted  She is not diaphoretic  No erythema  No pallor  Psychiatric: She has a normal mood and affect   Her behavior is normal  Judgment and thought content normal

## 2019-01-07 NOTE — PROGRESS NOTES
Patient has had cold symptoms for over a week  Runny nose, scratchy throat cough  She has not had any shortness of breath or chest pain  No high fever or chills  Recently the drainage from her nose is changed in character and has worsened    Review of systems for GI  cardiac pulmonary and neurologic systems are all otherwise negative  ENT review of systems is also otherwise negative  /84 (BP Location: Left arm, Patient Position: Sitting, Cuff Size: Adult)   Pulse 86   Temp 98 4 °F (36 9 °C)   Wt 49 kg (108 lb)   SpO2 98%   BMI 23 79 kg/m²     ENT was normal except for red nasal turbinates and postnasal drip  Some lymphoid hyperplasia in the pharynx  Mild cervical lymphadenopathy chest was completely clear to percussion auscultation cardiac exam was normal    Rhinosinusitis    Cefzil, Tessalon Perles  Cold precautions  Call if no improvement

## 2019-01-11 ENCOUNTER — TELEPHONE (OUTPATIENT)
Dept: FAMILY MEDICINE CLINIC | Facility: MEDICAL CENTER | Age: 54
End: 2019-01-11

## 2019-01-11 NOTE — TELEPHONE ENCOUNTER
Called c/o nasal congestion again x a couple days  Asking for advice   Suggest sudafed or mucinex , increase fluids, humidifier, call if not improving

## 2019-01-15 ENCOUNTER — TELEPHONE (OUTPATIENT)
Dept: GYNECOLOGIC ONCOLOGY | Facility: CLINIC | Age: 54
End: 2019-01-15

## 2019-01-15 NOTE — TELEPHONE ENCOUNTER
Genetic testing results NEGATIVE for variation or pathogenic mutation  Called patient with results, LMOM

## 2019-04-11 ENCOUNTER — ANNUAL EXAM (OUTPATIENT)
Dept: OBGYN CLINIC | Facility: CLINIC | Age: 54
End: 2019-04-11
Payer: COMMERCIAL

## 2019-04-11 VITALS
WEIGHT: 104 LBS | BODY MASS INDEX: 22.44 KG/M2 | DIASTOLIC BLOOD PRESSURE: 70 MMHG | HEIGHT: 57 IN | SYSTOLIC BLOOD PRESSURE: 120 MMHG

## 2019-04-11 DIAGNOSIS — Z11.51 SCREENING FOR HPV (HUMAN PAPILLOMAVIRUS): ICD-10-CM

## 2019-04-11 DIAGNOSIS — Z12.4 ROUTINE CERVICAL SMEAR: Primary | ICD-10-CM

## 2019-04-11 DIAGNOSIS — N95.2 VAGINAL ATROPHY: ICD-10-CM

## 2019-04-11 DIAGNOSIS — Z12.39 SCREENING FOR BREAST CANCER: ICD-10-CM

## 2019-04-11 PROCEDURE — S0612 ANNUAL GYNECOLOGICAL EXAMINA: HCPCS | Performed by: OBSTETRICS & GYNECOLOGY

## 2019-04-21 LAB
HPV HR 12 DNA CVX QL NAA+PROBE: NOT DETECTED
HPV16 DNA SPEC QL NAA+PROBE: NOT DETECTED
HPV18 DNA SPEC QL NAA+PROBE: NOT DETECTED
THIN PREP CVX: NORMAL

## 2019-05-16 DIAGNOSIS — F41.9 ANXIETY: ICD-10-CM

## 2019-05-17 RX ORDER — ALPRAZOLAM 0.25 MG/1
0.25 TABLET ORAL 2 TIMES DAILY PRN
Qty: 30 TABLET | Refills: 0 | OUTPATIENT
Start: 2019-05-17 | End: 2020-08-21 | Stop reason: SDUPTHER

## 2019-07-08 ENCOUNTER — OFFICE VISIT (OUTPATIENT)
Dept: FAMILY MEDICINE CLINIC | Facility: MEDICAL CENTER | Age: 54
End: 2019-07-08
Payer: COMMERCIAL

## 2019-07-08 VITALS
WEIGHT: 109.2 LBS | SYSTOLIC BLOOD PRESSURE: 130 MMHG | RESPIRATION RATE: 16 BRPM | DIASTOLIC BLOOD PRESSURE: 70 MMHG | HEART RATE: 68 BPM | BODY MASS INDEX: 23.24 KG/M2

## 2019-07-08 DIAGNOSIS — L73.9 FOLLICULITIS: Primary | ICD-10-CM

## 2019-07-08 DIAGNOSIS — E78.01 FAMILIAL HYPERCHOLESTEROLEMIA: ICD-10-CM

## 2019-07-08 DIAGNOSIS — R21 FACIAL RASH: Primary | ICD-10-CM

## 2019-07-08 PROCEDURE — 87077 CULTURE AEROBIC IDENTIFY: CPT | Performed by: FAMILY MEDICINE

## 2019-07-08 PROCEDURE — 87070 CULTURE OTHR SPECIMN AEROBIC: CPT | Performed by: FAMILY MEDICINE

## 2019-07-08 PROCEDURE — 99213 OFFICE O/P EST LOW 20 MIN: CPT | Performed by: FAMILY MEDICINE

## 2019-07-08 PROCEDURE — 87185 SC STD ENZYME DETCJ PER NZM: CPT | Performed by: FAMILY MEDICINE

## 2019-07-08 RX ORDER — CEPHALEXIN 500 MG/1
500 CAPSULE ORAL EVERY 6 HOURS SCHEDULED
Qty: 28 CAPSULE | Refills: 0 | Status: SHIPPED | OUTPATIENT
Start: 2019-07-08 | End: 2019-07-15

## 2019-07-08 NOTE — PROGRESS NOTES
Ye Nathan is here for f/u  She is avoiding novoa, processed meats, fried foods,  lunch meats  Drinks low fat milk  She ahs a FH of high cholesterol  Brothers and sisters  Does not want to take a statin at this time  Otherwise she is doing well  She complains of painful bumps over her mouth and below eyes and the nose  Not itchy  Stopped creams, topical agents, etc with no difference  O: /70 (Cuff Size: Standard)   Pulse 68   Resp 16   Wt 49 5 kg (109 lb 3 2 oz)   LMP  (LMP Unknown)   BMI 23 24 kg/m²    Skin  a few pustules are noted below the left eye and above the upper lip  She has a scab on the tip of her nose  No adenopathy    Assessment  1  Hyperlipidemia-significant increase in her cholesterol  Suspect this is familial   We reviewed low-fat diet  Will repeat lipid profile 6 months  May need CT assessment at that time  2  Positive thyroid antibodies-still needs TSH  3  Facial rash-possible folliculitis    Plan  Check nasal culture  Rx for Keflex  Call with progress  If no improvement refer derm  Lipid profile 6 months

## 2019-07-11 LAB
BACTERIA NOSE AEROBE CULT: ABNORMAL
BACTERIA NOSE AEROBE CULT: ABNORMAL

## 2019-07-13 ENCOUNTER — APPOINTMENT (OUTPATIENT)
Dept: LAB | Facility: MEDICAL CENTER | Age: 54
End: 2019-07-13
Payer: COMMERCIAL

## 2019-07-13 DIAGNOSIS — E78.01 FAMILIAL HYPERCHOLESTEROLEMIA: ICD-10-CM

## 2019-07-13 DIAGNOSIS — R79.89 ABNORMAL THYROID BLOOD TEST: ICD-10-CM

## 2019-07-13 LAB
CHOLEST SERPL-MCNC: 247 MG/DL (ref 50–200)
HDLC SERPL-MCNC: 62 MG/DL (ref 40–60)
LDLC SERPL CALC-MCNC: 163 MG/DL (ref 0–100)
NONHDLC SERPL-MCNC: 185 MG/DL
TRIGL SERPL-MCNC: 110 MG/DL
TSH SERPL DL<=0.05 MIU/L-ACNC: 2.67 UIU/ML (ref 0.36–3.74)

## 2019-07-13 PROCEDURE — 84443 ASSAY THYROID STIM HORMONE: CPT

## 2019-07-13 PROCEDURE — 80061 LIPID PANEL: CPT

## 2019-07-13 PROCEDURE — 36415 COLL VENOUS BLD VENIPUNCTURE: CPT

## 2019-07-18 ENCOUNTER — TELEPHONE (OUTPATIENT)
Dept: FAMILY MEDICINE CLINIC | Facility: MEDICAL CENTER | Age: 54
End: 2019-07-18

## 2019-07-18 DIAGNOSIS — E78.01 FAMILIAL HYPERCHOLESTEROLEMIA: Primary | ICD-10-CM

## 2019-07-18 NOTE — TELEPHONE ENCOUNTER
----- Message from Tessa Fitzpatrick MD sent at 7/18/2019  9:18 AM EDT -----  Addendum to message regarding facial rash:  She had her lipid profile and TSH done  However I thought we had said she would do this in 6 months instead to see how she made out with dietary changes  Looks like she went ahead and did now  anyway  In any case it is still elevated at 247 although not as high  Still recommend lipid profile in 6 months

## 2019-07-18 NOTE — TELEPHONE ENCOUNTER
FYI: Sujatha Maritza states it has gotten better, its not as bad or pronounced  She did complete the antibiotic  Maybe 5 to 10% remains  I advised if does not totally resolve in another 1 to 2 weeks to call us back

## 2019-07-18 NOTE — TELEPHONE ENCOUNTER
----- Message from Ranjit Mary MD sent at 7/18/2019  8:45 AM EDT -----  How is patients facial rash?

## 2019-10-23 ENCOUNTER — OFFICE VISIT (OUTPATIENT)
Dept: FAMILY MEDICINE CLINIC | Facility: MEDICAL CENTER | Age: 54
End: 2019-10-23
Payer: COMMERCIAL

## 2019-10-23 VITALS
HEIGHT: 57 IN | WEIGHT: 109.25 LBS | RESPIRATION RATE: 14 BRPM | HEART RATE: 80 BPM | BODY MASS INDEX: 23.57 KG/M2 | DIASTOLIC BLOOD PRESSURE: 70 MMHG | SYSTOLIC BLOOD PRESSURE: 110 MMHG | TEMPERATURE: 98.3 F

## 2019-10-23 DIAGNOSIS — R05.9 COUGH: Primary | ICD-10-CM

## 2019-10-23 PROCEDURE — 99213 OFFICE O/P EST LOW 20 MIN: CPT | Performed by: FAMILY MEDICINE

## 2019-10-23 PROCEDURE — 3008F BODY MASS INDEX DOCD: CPT | Performed by: FAMILY MEDICINE

## 2019-10-23 RX ORDER — AZITHROMYCIN 250 MG/1
TABLET, FILM COATED ORAL
Qty: 6 TABLET | Refills: 0 | Status: SHIPPED | OUTPATIENT
Start: 2019-10-23 | End: 2019-10-27

## 2019-10-23 RX ORDER — PREDNISONE 20 MG/1
20 TABLET ORAL DAILY
Qty: 5 TABLET | Refills: 0 | Status: SHIPPED | OUTPATIENT
Start: 2019-10-23 | End: 2019-10-28

## 2019-10-23 RX ORDER — BENZONATATE 200 MG/1
200 CAPSULE ORAL 3 TIMES DAILY PRN
Qty: 20 CAPSULE | Refills: 0 | Status: SHIPPED | OUTPATIENT
Start: 2019-10-23 | End: 2020-09-03 | Stop reason: ALTCHOICE

## 2019-10-23 NOTE — PROGRESS NOTES
Charles Fishman started with a heaviness in her chest 2 weeks ago  No sore throat or earache  Some nasal congestion  Cough persistent and productive  Green sputum  Feels a litle short of breath  Tried Nyquil, Dayquil, Mucinex with no relief     O: /70   Pulse 80   Temp 98 3 °F (36 8 °C)   Resp 14   Ht 4' 8 5" (1 435 m)   Wt 49 6 kg (109 lb 4 oz)   LMP  (LMP Unknown)   BMI 24 06 kg/m²   HEENT-TMs are normal   Pharynx with slight erythema  No exudate  Sinuses nontender  Eyes clear  Neck no adenopathy  Chest clear however harsh staccato type cough    Assessment  Tracheobronchitis    Plan  Rx for prednisone, Tessalon Perles, and Zithromax  Fluids  Call if no better

## 2020-07-20 ENCOUNTER — ANNUAL EXAM (OUTPATIENT)
Dept: OBGYN CLINIC | Facility: CLINIC | Age: 55
End: 2020-07-20
Payer: COMMERCIAL

## 2020-07-20 VITALS
WEIGHT: 112 LBS | SYSTOLIC BLOOD PRESSURE: 110 MMHG | DIASTOLIC BLOOD PRESSURE: 70 MMHG | TEMPERATURE: 97.5 F | BODY MASS INDEX: 24.16 KG/M2 | HEIGHT: 57 IN

## 2020-07-20 DIAGNOSIS — Z12.31 ENCOUNTER FOR SCREENING MAMMOGRAM FOR BREAST CANCER: Primary | ICD-10-CM

## 2020-07-20 DIAGNOSIS — Z01.419 ENCOUNTER FOR ANNUAL ROUTINE GYNECOLOGICAL EXAMINATION: ICD-10-CM

## 2020-07-20 DIAGNOSIS — N95.2 VAGINAL ATROPHY: ICD-10-CM

## 2020-07-20 PROCEDURE — S0612 ANNUAL GYNECOLOGICAL EXAMINA: HCPCS | Performed by: OBSTETRICS & GYNECOLOGY

## 2020-07-20 RX ORDER — ESTRADIOL 10 UG/1
1 INSERT VAGINAL 2 TIMES WEEKLY
Qty: 24 TABLET | Refills: 4 | Status: SHIPPED | OUTPATIENT
Start: 2020-07-20 | End: 2021-02-25 | Stop reason: SDUPTHER

## 2020-07-20 NOTE — PROGRESS NOTES
Patient is here for yearly exam     Patient is not due for a pap smear at this visit  4/11/19 Normal Pap, Negative HPV  3/22/17 Normal Pap    2/24/16 Normal Pap, Negative HPV   6/11/18 Normal MAmmo

## 2020-07-20 NOTE — PROGRESS NOTES
Assessment/Plan:    Normal breast and gyn exam except for vaginal atrophy  Normal colonoscopy 2015  Status post BSO    Plan:  Rx mammogram   Rx vaginal estrogen tab 10 micro g twice a week  Add a multivitamin in addition to her vitamin D3  Recommend flu vaccine      Subjective:      Patient ID: Sam Jewell is a 54 y  o  female presents for yearly exam with no complaints except vaginal atrophy  Patient sexually active and has discomfort with intercourse  Patient denies any breast bowel or bladder issues  Normal colonoscopy   No change family history  Medications reviewed  Review of Systems   Constitutional: Negative  HENT: Negative  Eyes: Negative  Respiratory: Negative  Cardiovascular: Negative  Gastrointestinal: Negative  Endocrine: Negative  Genitourinary: Positive for dyspareunia  Musculoskeletal: Negative  Skin: Negative  Allergic/Immunologic: Negative  Neurological: Negative  Hematological: Negative  Psychiatric/Behavioral: Negative  All other systems reviewed and are negative  Objective:      /70   Temp 97 5 °F (36 4 °C)   Ht 4' 8 5" (1 435 m)   Wt 50 8 kg (112 lb)   LMP  (LMP Unknown)   Breastfeeding No   BMI 24 67 kg/m²          Physical Exam   Constitutional: She is oriented to person, place, and time  She appears well-developed and well-nourished  HENT:   Head: Normocephalic and atraumatic  Neck: Normal range of motion  Neck supple  No tracheal deviation present  No thyromegaly present  Cardiovascular: Normal rate, regular rhythm and normal heart sounds  Pulmonary/Chest: Effort normal and breath sounds normal  No stridor  No respiratory distress  She has no wheezes  She has no rales  She exhibits no tenderness  Right breast exhibits no inverted nipple, no mass, no nipple discharge, no skin change and no tenderness   Left breast exhibits no inverted nipple, no mass, no nipple discharge, no skin change and no tenderness  No breast swelling, tenderness, discharge or bleeding  Breasts are symmetrical    Abdominal: Soft  Bowel sounds are normal  She exhibits no distension and no mass  There is no tenderness  There is no rebound and no guarding  No hernia  Hernia confirmed negative in the right inguinal area and confirmed negative in the left inguinal area  Genitourinary: Vagina normal and uterus normal  Rectal exam shows no external hemorrhoid, no internal hemorrhoid, no fissure and no mass  No breast swelling, tenderness, discharge or bleeding  No labial fusion  There is no rash, tenderness, lesion or injury on the right labia  There is no rash, tenderness, lesion or injury on the left labia  Uterus is not deviated, not enlarged, not fixed and not tender  Cervix exhibits no motion tenderness, no discharge and no friability  Right adnexum displays no mass, no tenderness and no fullness  Left adnexum displays no mass, no tenderness and no fullness  No erythema, tenderness or bleeding in the vagina  No foreign body in the vagina  No signs of injury around the vagina  No vaginal discharge found  Genitourinary Comments: Normal urethra  Mild vaginal atrophy  Lymphadenopathy: No inguinal adenopathy noted on the right or left side  Neurological: She is alert and oriented to person, place, and time  Skin: Skin is warm and dry  Psychiatric: She has a normal mood and affect   Her behavior is normal  Judgment and thought content normal

## 2020-08-21 DIAGNOSIS — F41.9 ANXIETY: ICD-10-CM

## 2020-08-24 DIAGNOSIS — F41.9 ANXIETY: ICD-10-CM

## 2020-08-24 RX ORDER — ALPRAZOLAM 0.25 MG/1
0.25 TABLET ORAL 2 TIMES DAILY PRN
Qty: 30 TABLET | Refills: 0 | OUTPATIENT
Start: 2020-08-24

## 2020-08-24 RX ORDER — ALPRAZOLAM 0.25 MG/1
0.25 TABLET ORAL 2 TIMES DAILY PRN
Qty: 30 TABLET | Refills: 0 | OUTPATIENT
Start: 2020-08-24 | End: 2020-08-24

## 2020-08-24 RX ORDER — ALPRAZOLAM 0.25 MG/1
0.25 TABLET ORAL 2 TIMES DAILY PRN
Qty: 30 TABLET | Refills: 0 | Status: SHIPPED | OUTPATIENT
Start: 2020-08-24 | End: 2021-09-09

## 2020-08-25 NOTE — TELEPHONE ENCOUNTER
Called pt to set up appt  Since I was alone at desk I ask if she could hold a minute while I searched for an appt and patient was calling to check in and she hung up  Will call again tomorrow

## 2020-08-25 NOTE — TELEPHONE ENCOUNTER
She is going to need  an appointment before I can refill the Xanax  Has been over a year since last seen

## 2020-09-03 ENCOUNTER — OFFICE VISIT (OUTPATIENT)
Dept: FAMILY MEDICINE CLINIC | Facility: MEDICAL CENTER | Age: 55
End: 2020-09-03
Payer: COMMERCIAL

## 2020-09-03 VITALS
SYSTOLIC BLOOD PRESSURE: 126 MMHG | HEART RATE: 68 BPM | TEMPERATURE: 98.1 F | DIASTOLIC BLOOD PRESSURE: 70 MMHG | RESPIRATION RATE: 16 BRPM | WEIGHT: 117.4 LBS | BODY MASS INDEX: 25.86 KG/M2

## 2020-09-03 DIAGNOSIS — R01.1 CARDIAC MURMUR: ICD-10-CM

## 2020-09-03 DIAGNOSIS — E78.01 FAMILIAL HYPERCHOLESTEROLEMIA: ICD-10-CM

## 2020-09-03 DIAGNOSIS — F41.9 ANXIETY: Primary | ICD-10-CM

## 2020-09-03 PROCEDURE — 99213 OFFICE O/P EST LOW 20 MIN: CPT | Performed by: FAMILY MEDICINE

## 2020-09-03 RX ORDER — DIPHENOXYLATE HYDROCHLORIDE AND ATROPINE SULFATE 2.5; .025 MG/1; MG/1
1 TABLET ORAL DAILY
COMMUNITY

## 2020-09-03 NOTE — PROGRESS NOTES
Tara Shah is here for anxiety  She has been doing very well despite the fact that she is caring for her family and going back to school to become a    She hopes to graduate in January  She uses Xanax only occasionally  She says she  has not been exercising    She has not had any chest pain or shortness of breath  She was seen by cardiology in 2018 for preop clearance for palpitations as well as for systolic murmur  Last echo was 2015 which showed aortic sclerosis but no other abnormalities  Patient states she was told she did not eat any further follow-up however I see no record of this on the chart  She said she has a history of mitral valve prolapse  She saw gyn for her annual exam   Leontine Kocher is scheduled  Colonoscopy was 2015 10 year follow-up    O: /70 (Cuff Size: Standard)   Pulse 68   Temp 98 1 °F (36 7 °C)   Resp 16   Wt 53 3 kg (117 lb 6 4 oz)   LMP  (LMP Unknown)   BMI 25 86 kg/m²   Neck no adenopathy thyromegaly bruits  Chest is clear with good breath sounds  Cardiac regular rate rhythm   There is a grade 1 to 2/6 systolic murmur at the upper left sternal border and upper right sternal border    Assessment  1  Anxiety-well controlled with occasional did alprazolam   2  Systolic murmur-last echo was 5 years ago; will do follow-up  3  Hyperlipidemia-due for blood work  4  Health maintenance-up-to-date  Advised flu shot which she declines  Plan  Check echo  Check blood work  Recheck 1 year

## 2020-11-06 ENCOUNTER — HOSPITAL ENCOUNTER (OUTPATIENT)
Dept: NON INVASIVE DIAGNOSTICS | Facility: CLINIC | Age: 55
Discharge: HOME | End: 2020-11-06
Payer: COMMERCIAL

## 2020-11-06 DIAGNOSIS — R01.1 CARDIAC MURMUR: ICD-10-CM

## 2020-11-06 PROCEDURE — 93306 TTE W/DOPPLER COMPLETE: CPT

## 2020-11-06 PROCEDURE — 93306 TTE W/DOPPLER COMPLETE: CPT | Performed by: INTERNAL MEDICINE

## 2020-11-09 ENCOUNTER — TELEPHONE (OUTPATIENT)
Dept: FAMILY MEDICINE CLINIC | Facility: MEDICAL CENTER | Age: 55
End: 2020-11-09

## 2021-02-25 DIAGNOSIS — N95.2 VAGINAL ATROPHY: ICD-10-CM

## 2021-02-25 RX ORDER — ESTRADIOL 10 UG/1
1 INSERT VAGINAL 2 TIMES WEEKLY
Qty: 24 TABLET | Refills: 1 | Status: SHIPPED | OUTPATIENT
Start: 2021-02-25 | End: 2021-07-22 | Stop reason: SDUPTHER

## 2021-04-13 DIAGNOSIS — Z23 ENCOUNTER FOR IMMUNIZATION: ICD-10-CM

## 2021-04-21 ENCOUNTER — TELEPHONE (OUTPATIENT)
Dept: FAMILY MEDICINE CLINIC | Facility: MEDICAL CENTER | Age: 56
End: 2021-04-21

## 2021-04-21 DIAGNOSIS — Z20.822 EXPOSURE TO COVID-19 VIRUS: Primary | ICD-10-CM

## 2021-04-21 DIAGNOSIS — Z20.822 EXPOSURE TO COVID-19 VIRUS: ICD-10-CM

## 2021-04-21 PROCEDURE — U0003 INFECTIOUS AGENT DETECTION BY NUCLEIC ACID (DNA OR RNA); SEVERE ACUTE RESPIRATORY SYNDROME CORONAVIRUS 2 (SARS-COV-2) (CORONAVIRUS DISEASE [COVID-19]), AMPLIFIED PROBE TECHNIQUE, MAKING USE OF HIGH THROUGHPUT TECHNOLOGIES AS DESCRIBED BY CMS-2020-01-R: HCPCS | Performed by: FAMILY MEDICINE

## 2021-04-21 PROCEDURE — U0005 INFEC AGEN DETEC AMPLI PROBE: HCPCS | Performed by: FAMILY MEDICINE

## 2021-04-21 NOTE — TELEPHONE ENCOUNTER
Pt called to schedule appt with Dr Isacc Bloom  She tried to schedule an appt through [employer], but it said the time was not available  Last week she spent time with her 6year old grandson  He had congestion, fever and a sore throat  Shortly thereafter, she started with a cough and congestion  She is scheduled to get her covid vaccine on Saturday and wanted to get checked out by Dr Isacc Bloom  Routed to Clinical - covid testing?

## 2021-04-22 ENCOUNTER — TELEPHONE (OUTPATIENT)
Dept: FAMILY MEDICINE CLINIC | Facility: MEDICAL CENTER | Age: 56
End: 2021-04-22

## 2021-04-22 LAB — SARS-COV-2 RNA RESP QL NAA+PROBE: NEGATIVE

## 2021-04-22 NOTE — TELEPHONE ENCOUNTER
Triaged-scratchy throat at first, then PND with a cough   Advised treating at home as a cold   If no fever then I suggest she keep her appt for the vaccine since her covid test is negative

## 2021-04-24 ENCOUNTER — IMMUNIZATIONS (OUTPATIENT)
Dept: FAMILY MEDICINE CLINIC | Facility: HOSPITAL | Age: 56
End: 2021-04-24

## 2021-04-24 DIAGNOSIS — Z23 ENCOUNTER FOR IMMUNIZATION: Primary | ICD-10-CM

## 2021-04-24 PROCEDURE — 91300 SARS-COV-2 / COVID-19 MRNA VACCINE (PFIZER-BIONTECH) 30 MCG: CPT

## 2021-04-24 PROCEDURE — 0001A SARS-COV-2 / COVID-19 MRNA VACCINE (PFIZER-BIONTECH) 30 MCG: CPT

## 2021-05-15 ENCOUNTER — IMMUNIZATIONS (OUTPATIENT)
Dept: FAMILY MEDICINE CLINIC | Facility: HOSPITAL | Age: 56
End: 2021-05-15

## 2021-05-15 DIAGNOSIS — Z23 ENCOUNTER FOR IMMUNIZATION: Primary | ICD-10-CM

## 2021-05-15 PROCEDURE — 91300 SARS-COV-2 / COVID-19 MRNA VACCINE (PFIZER-BIONTECH) 30 MCG: CPT

## 2021-05-15 PROCEDURE — 0002A SARS-COV-2 / COVID-19 MRNA VACCINE (PFIZER-BIONTECH) 30 MCG: CPT

## 2021-06-16 ENCOUNTER — HOSPITAL ENCOUNTER (OUTPATIENT)
Dept: RADIOLOGY | Age: 56
Discharge: HOME | End: 2021-06-16
Payer: COMMERCIAL

## 2021-06-16 VITALS — WEIGHT: 117 LBS | BODY MASS INDEX: 25.24 KG/M2 | HEIGHT: 57 IN

## 2021-06-16 DIAGNOSIS — Z12.31 ENCOUNTER FOR SCREENING MAMMOGRAM FOR BREAST CANCER: ICD-10-CM

## 2021-06-16 PROCEDURE — 77067 SCR MAMMO BI INCL CAD: CPT

## 2021-06-16 PROCEDURE — 77063 BREAST TOMOSYNTHESIS BI: CPT

## 2021-07-22 ENCOUNTER — ANNUAL EXAM (OUTPATIENT)
Dept: OBGYN CLINIC | Facility: CLINIC | Age: 56
End: 2021-07-22
Payer: COMMERCIAL

## 2021-07-22 VITALS
BODY MASS INDEX: 23.3 KG/M2 | WEIGHT: 111 LBS | DIASTOLIC BLOOD PRESSURE: 80 MMHG | HEIGHT: 58 IN | SYSTOLIC BLOOD PRESSURE: 144 MMHG

## 2021-07-22 DIAGNOSIS — Z01.419 ENCOUNTER FOR ANNUAL ROUTINE GYNECOLOGICAL EXAMINATION: ICD-10-CM

## 2021-07-22 DIAGNOSIS — N95.2 VAGINAL ATROPHY: ICD-10-CM

## 2021-07-22 DIAGNOSIS — Z12.31 ENCOUNTER FOR SCREENING MAMMOGRAM FOR BREAST CANCER: Primary | ICD-10-CM

## 2021-07-22 PROCEDURE — S0612 ANNUAL GYNECOLOGICAL EXAMINA: HCPCS | Performed by: OBSTETRICS & GYNECOLOGY

## 2021-07-22 RX ORDER — ESTRADIOL 10 UG/1
1 INSERT VAGINAL 2 TIMES WEEKLY
Qty: 24 TABLET | Refills: 1 | Status: SHIPPED | OUTPATIENT
Start: 2021-07-22

## 2021-07-22 NOTE — PROGRESS NOTES
Assessment/Plan:    Normal breast exam  Mild vaginal atrophy  Doing well on vaginal estrogen would like to continue  Normal colonoscopy   Normal mammogram 2021  Received COVID-19 vaccine    Plan:  Rx mammogram for next year  Continue healthy diet exercise  Continue vitamin-C vitamin-D and zinc     Subjective:      Patient ID: Halima Watkins is a 64 y o  female presents for yearly exam with no complaints  Patient doing well on vaginal estrogen would like to continue  Patient denies any pelvic pain vaginal bleeding breast bowel or bladder issues  No change in family history  Medications reviewed  Review of Systems   Constitutional: Negative  Negative for fatigue, fever and unexpected weight change  HENT: Negative  Eyes: Negative  Respiratory: Negative  Negative for chest tightness, shortness of breath, wheezing and stridor  Cardiovascular: Negative  Negative for chest pain, palpitations and leg swelling  Gastrointestinal: Negative  Negative for abdominal pain, blood in stool, diarrhea, nausea, rectal pain and vomiting  Endocrine: Negative  Genitourinary: Negative for dysuria, frequency, vaginal bleeding, vaginal discharge and vaginal pain  Musculoskeletal: Negative  Skin: Negative  Allergic/Immunologic: Negative  Neurological: Negative  Hematological: Negative  Psychiatric/Behavioral: Negative  All other systems reviewed and are negative  Objective:      /80   Ht 4' 10 27" (1 48 m)   Wt 50 3 kg (111 lb)   LMP  (LMP Unknown)   BMI 22 99 kg/m²          Physical Exam  Constitutional:       Appearance: She is well-developed  HENT:      Head: Normocephalic and atraumatic  Neck:      Thyroid: No thyromegaly  Trachea: No tracheal deviation  Cardiovascular:      Rate and Rhythm: Normal rate and regular rhythm  Heart sounds: Normal heart sounds     Pulmonary:      Effort: Pulmonary effort is normal  No respiratory distress  Breath sounds: Normal breath sounds  No stridor  No wheezing or rales  Chest:      Chest wall: No tenderness  Breasts: Breasts are symmetrical          Right: No inverted nipple, mass, nipple discharge, skin change or tenderness  Left: No inverted nipple, mass, nipple discharge, skin change or tenderness  Abdominal:      General: Bowel sounds are normal  There is no distension  Palpations: Abdomen is soft  There is no mass  Tenderness: There is no abdominal tenderness  There is no guarding or rebound  Hernia: No hernia is present  There is no hernia in the left inguinal area  Genitourinary:     Labia:         Right: No rash, tenderness, lesion or injury  Left: No rash, tenderness, lesion or injury  Vagina: Normal  No signs of injury and foreign body  No vaginal discharge, erythema, tenderness or bleeding  Cervix: No cervical motion tenderness, discharge or friability  Uterus: Not deviated, not enlarged, not fixed and not tender  Adnexa:         Right: No mass, tenderness or fullness  Left: No mass, tenderness or fullness  Rectum: No mass, anal fissure, external hemorrhoid or internal hemorrhoid  Musculoskeletal:      Cervical back: Normal range of motion and neck supple  Lymphadenopathy:      Lower Body: No right inguinal adenopathy  No left inguinal adenopathy  Skin:     General: Skin is warm and dry  Neurological:      Mental Status: She is alert and oriented to person, place, and time  Psychiatric:         Behavior: Behavior normal          Thought Content:  Thought content normal          Judgment: Judgment normal

## 2021-07-22 NOTE — PROGRESS NOTES
The patient is here for a yearly  She is not due for a pap smear  4/11/19 Normal Pap, Negative HPV  3/22/17 Normal Pap    2/24/16 Normal Pap, Negative HPV  The patient has pain when she has intercourse  She also has vaginal dryness  The vagifem is helping a little  No vaginal itching or discharge  No bleeding or cramping  No breast or bowel issues  The patient urinary frequency but this is an issue she had before

## 2021-09-16 ENCOUNTER — OFFICE VISIT (OUTPATIENT)
Dept: FAMILY MEDICINE CLINIC | Facility: MEDICAL CENTER | Age: 56
End: 2021-09-16
Payer: COMMERCIAL

## 2021-09-16 VITALS
BODY MASS INDEX: 22.36 KG/M2 | RESPIRATION RATE: 16 BRPM | HEART RATE: 78 BPM | DIASTOLIC BLOOD PRESSURE: 80 MMHG | SYSTOLIC BLOOD PRESSURE: 150 MMHG | TEMPERATURE: 96.8 F | WEIGHT: 108 LBS

## 2021-09-16 DIAGNOSIS — Z13.29 THYROID DISORDER SCREENING: ICD-10-CM

## 2021-09-16 DIAGNOSIS — Z00.00 ROUTINE ADULT HEALTH MAINTENANCE: ICD-10-CM

## 2021-09-16 DIAGNOSIS — E78.01 FAMILIAL HYPERCHOLESTEROLEMIA: Primary | ICD-10-CM

## 2021-09-16 DIAGNOSIS — Z78.0 POST-MENOPAUSAL: ICD-10-CM

## 2021-09-16 DIAGNOSIS — E55.9 VITAMIN D DEFICIENCY: ICD-10-CM

## 2021-09-16 PROCEDURE — 99396 PREV VISIT EST AGE 40-64: CPT | Performed by: FAMILY MEDICINE

## 2021-09-16 NOTE — PROGRESS NOTES
Johnie Nesbitt is here for CPX  Medically history  Unchanged  FH: unchanged  HH: Active at work with stairs otherwise none  Diet is healthy with fruits nad vegetables and whole grains  Dietary calcium not much  Caffeine 2 1/2 coffee daily  No soda  Alcohol 1 daily  SH: Lives with  and adult daughter and grand children   Working as ;    Colonoscopy 2015  10 year f/u  Mammogram 2021  Dexa scan none  Eye checkup 2021      She noticed a lump under her left arm  She  gets episodes of nasal congestion an dpnd and cough  Can be related to temp changes  No known allergies  Review of Systems   Constitutional: Negative for chills and fever  HENT: Positive for congestion  Respiratory: Negative for shortness of breath and wheezing  Cardiovascular: Negative for chest pain, palpitations and leg swelling  Gastrointestinal: Negative for abdominal pain and blood in stool  Musculoskeletal: Negative for arthralgias  Skin: Negative for rash  Neurological: Negative for dizziness  Left arm -no skin lesions or masses are palpable  There is no left axillary adenopathy  Assessment   1  Health maintenance- up-to-date with breast, cervical, and colorectal cancer screening  We discussed osteoporosis prevention with  Calcium and vitamin-D supplementation  Avoid bone robbers   Such as caffeine and soda  Advised weight-bearing exercise  Will check a DEXA scan with her next mammogram     Immunizations were updated  She declines flu shot this year  Discussed potential COVID vaccine booster  2  Anxiety-  Uses occasional alprazolam    Plan   Check blood work    DEXA scan

## 2021-10-16 ENCOUNTER — APPOINTMENT (OUTPATIENT)
Dept: LAB | Facility: MEDICAL CENTER | Age: 56
End: 2021-10-16
Payer: COMMERCIAL

## 2021-10-16 DIAGNOSIS — Z13.29 THYROID DISORDER SCREENING: ICD-10-CM

## 2021-10-16 DIAGNOSIS — E78.01 FAMILIAL HYPERCHOLESTEROLEMIA: ICD-10-CM

## 2021-10-16 DIAGNOSIS — E55.9 VITAMIN D DEFICIENCY: ICD-10-CM

## 2021-10-16 LAB
25(OH)D3 SERPL-MCNC: 16.6 NG/ML (ref 30–100)
ALBUMIN SERPL BCP-MCNC: 3.7 G/DL (ref 3.5–5)
ALP SERPL-CCNC: 83 U/L (ref 46–116)
ALT SERPL W P-5'-P-CCNC: 19 U/L (ref 12–78)
ANION GAP SERPL CALCULATED.3IONS-SCNC: 5 MMOL/L (ref 4–13)
AST SERPL W P-5'-P-CCNC: 16 U/L (ref 5–45)
BASOPHILS # BLD AUTO: 0.04 THOUSANDS/ΜL (ref 0–0.1)
BASOPHILS NFR BLD AUTO: 1 % (ref 0–1)
BILIRUB SERPL-MCNC: 0.73 MG/DL (ref 0.2–1)
BUN SERPL-MCNC: 8 MG/DL (ref 5–25)
CALCIUM SERPL-MCNC: 8.9 MG/DL (ref 8.3–10.1)
CHLORIDE SERPL-SCNC: 102 MMOL/L (ref 100–108)
CHOLEST SERPL-MCNC: 221 MG/DL (ref 50–200)
CO2 SERPL-SCNC: 27 MMOL/L (ref 21–32)
CREAT SERPL-MCNC: 0.65 MG/DL (ref 0.6–1.3)
EOSINOPHIL # BLD AUTO: 0.07 THOUSAND/ΜL (ref 0–0.61)
EOSINOPHIL NFR BLD AUTO: 1 % (ref 0–6)
ERYTHROCYTE [DISTWIDTH] IN BLOOD BY AUTOMATED COUNT: 13.6 % (ref 11.6–15.1)
GFR SERPL CREATININE-BSD FRML MDRD: 100 ML/MIN/1.73SQ M
GLUCOSE P FAST SERPL-MCNC: 79 MG/DL (ref 65–99)
HCT VFR BLD AUTO: 40.3 % (ref 34.8–46.1)
HDLC SERPL-MCNC: 57 MG/DL
HGB BLD-MCNC: 12.7 G/DL (ref 11.5–15.4)
IMM GRANULOCYTES # BLD AUTO: 0.02 THOUSAND/UL (ref 0–0.2)
IMM GRANULOCYTES NFR BLD AUTO: 0 % (ref 0–2)
LDLC SERPL CALC-MCNC: 144 MG/DL (ref 0–100)
LYMPHOCYTES # BLD AUTO: 2.37 THOUSANDS/ΜL (ref 0.6–4.47)
LYMPHOCYTES NFR BLD AUTO: 36 % (ref 14–44)
MCH RBC QN AUTO: 29.6 PG (ref 26.8–34.3)
MCHC RBC AUTO-ENTMCNC: 31.5 G/DL (ref 31.4–37.4)
MCV RBC AUTO: 94 FL (ref 82–98)
MONOCYTES # BLD AUTO: 0.65 THOUSAND/ΜL (ref 0.17–1.22)
MONOCYTES NFR BLD AUTO: 10 % (ref 4–12)
NEUTROPHILS # BLD AUTO: 3.39 THOUSANDS/ΜL (ref 1.85–7.62)
NEUTS SEG NFR BLD AUTO: 52 % (ref 43–75)
NONHDLC SERPL-MCNC: 164 MG/DL
NRBC BLD AUTO-RTO: 0 /100 WBCS
PLATELET # BLD AUTO: 243 THOUSANDS/UL (ref 149–390)
PMV BLD AUTO: 10.2 FL (ref 8.9–12.7)
POTASSIUM SERPL-SCNC: 4.1 MMOL/L (ref 3.5–5.3)
PROT SERPL-MCNC: 7.5 G/DL (ref 6.4–8.2)
RBC # BLD AUTO: 4.29 MILLION/UL (ref 3.81–5.12)
SODIUM SERPL-SCNC: 134 MMOL/L (ref 136–145)
TRIGL SERPL-MCNC: 100 MG/DL
TSH SERPL DL<=0.05 MIU/L-ACNC: 1.89 UIU/ML (ref 0.36–3.74)
WBC # BLD AUTO: 6.54 THOUSAND/UL (ref 4.31–10.16)

## 2021-10-16 PROCEDURE — 84443 ASSAY THYROID STIM HORMONE: CPT

## 2021-10-16 PROCEDURE — 82306 VITAMIN D 25 HYDROXY: CPT

## 2021-10-16 PROCEDURE — 85025 COMPLETE CBC W/AUTO DIFF WBC: CPT

## 2021-10-16 PROCEDURE — 80061 LIPID PANEL: CPT

## 2021-10-16 PROCEDURE — 80053 COMPREHEN METABOLIC PANEL: CPT

## 2021-10-16 PROCEDURE — 36415 COLL VENOUS BLD VENIPUNCTURE: CPT

## 2022-02-05 ENCOUNTER — IMMUNIZATIONS (OUTPATIENT)
Dept: FAMILY MEDICINE CLINIC | Facility: HOSPITAL | Age: 57
End: 2022-02-05

## 2022-02-05 PROCEDURE — 91305 COVID-19 PFIZER VACC TRIS-SUCROSE GRAY CAP 0.3 ML: CPT

## 2022-02-05 PROCEDURE — 0051A COVID-19 PFIZER VACC TRIS-SUCROSE GRAY CAP 0.3 ML: CPT

## 2022-07-26 ENCOUNTER — ANNUAL EXAM (OUTPATIENT)
Dept: OBGYN CLINIC | Facility: CLINIC | Age: 57
End: 2022-07-26
Payer: COMMERCIAL

## 2022-07-26 VITALS
SYSTOLIC BLOOD PRESSURE: 100 MMHG | HEIGHT: 58 IN | WEIGHT: 107 LBS | DIASTOLIC BLOOD PRESSURE: 60 MMHG | BODY MASS INDEX: 22.46 KG/M2

## 2022-07-26 DIAGNOSIS — Z01.419 ROUTINE GYNECOLOGICAL EXAMINATION: ICD-10-CM

## 2022-07-26 DIAGNOSIS — Z11.51 SCREENING FOR HPV (HUMAN PAPILLOMAVIRUS): ICD-10-CM

## 2022-07-26 DIAGNOSIS — Z12.31 ENCOUNTER FOR SCREENING MAMMOGRAM FOR BREAST CANCER: Primary | ICD-10-CM

## 2022-07-26 DIAGNOSIS — Z12.12 SCREENING FOR COLORECTAL CANCER: ICD-10-CM

## 2022-07-26 DIAGNOSIS — Z12.11 SCREENING FOR COLORECTAL CANCER: ICD-10-CM

## 2022-07-26 PROCEDURE — G0145 SCR C/V CYTO,THINLAYER,RESCR: HCPCS | Performed by: OBSTETRICS & GYNECOLOGY

## 2022-07-26 PROCEDURE — G0476 HPV COMBO ASSAY CA SCREEN: HCPCS | Performed by: OBSTETRICS & GYNECOLOGY

## 2022-07-26 PROCEDURE — S0612 ANNUAL GYNECOLOGICAL EXAMINA: HCPCS | Performed by: OBSTETRICS & GYNECOLOGY

## 2022-07-26 NOTE — PROGRESS NOTES
Assessment/Plan:    Breast and gyn exam  Normal Pap smear negative HPV 2019  Normal mammogram 2021  Normal colonoscopy 2014 due this year  COVID vaccine in booster    Plan:  Rx mammogram   Rx colonoscopy  Continue healthy diet exercise  Subjective: (c/s)     Patient ID: Naun Flores is a 62 y o  female presents for annual exam with no complaints  Patient denies any vaginal bleeding or pelvic pain  Presently not sexually active  Occasional vaginal dryness  Denies any bowel or bladder issues  Denies any breast problems  No change in family history  Sister (breast cancer) medications reviewed        Review of Systems   Constitutional: Negative  Negative for fatigue, fever and unexpected weight change  HENT: Negative  Eyes: Negative  Respiratory: Negative  Negative for chest tightness, shortness of breath, wheezing and stridor  Cardiovascular: Negative  Negative for chest pain, palpitations and leg swelling  Gastrointestinal: Negative  Negative for abdominal pain, blood in stool, diarrhea, nausea, rectal pain and vomiting  Endocrine: Negative  Genitourinary: Negative for dysuria, frequency, vaginal bleeding, vaginal discharge and vaginal pain  Musculoskeletal: Negative  Skin: Negative  Allergic/Immunologic: Negative  Neurological: Negative  Hematological: Negative  Psychiatric/Behavioral: Negative  All other systems reviewed and are negative  Objective:      /60   Ht 4' 10" (1 473 m)   Wt 48 5 kg (107 lb)   LMP  (LMP Unknown)   BMI 22 36 kg/m²          Physical Exam  Constitutional:       Appearance: She is well-developed  HENT:      Head: Normocephalic and atraumatic  Neck:      Thyroid: No thyromegaly  Trachea: No tracheal deviation  Cardiovascular:      Rate and Rhythm: Normal rate and regular rhythm  Heart sounds: Normal heart sounds  Pulmonary:      Effort: Pulmonary effort is normal  No respiratory distress  Breath sounds: Normal breath sounds  No stridor  No wheezing or rales  Chest:      Chest wall: No tenderness  Breasts: Breasts are symmetrical       Right: No inverted nipple, mass, nipple discharge, skin change or tenderness  Left: No inverted nipple, mass, nipple discharge, skin change or tenderness  Abdominal:      General: Bowel sounds are normal  There is no distension  Palpations: Abdomen is soft  There is no mass  Tenderness: There is no abdominal tenderness  There is no guarding or rebound  Hernia: No hernia is present  There is no hernia in the left inguinal area  Genitourinary:     Labia:         Right: No rash, tenderness, lesion or injury  Left: No rash, tenderness, lesion or injury  Vagina: Normal  No signs of injury and foreign body  No vaginal discharge, erythema, tenderness or bleeding  Cervix: No cervical motion tenderness, discharge or friability  Uterus: Not deviated, not enlarged, not fixed and not tender  Adnexa:         Right: No mass, tenderness or fullness  Left: No mass, tenderness or fullness  Rectum: No mass, anal fissure, external hemorrhoid or internal hemorrhoid  Musculoskeletal:      Cervical back: Normal range of motion and neck supple  Lymphadenopathy:      Lower Body: No right inguinal adenopathy  No left inguinal adenopathy  Skin:     General: Skin is warm and dry  Neurological:      Mental Status: She is alert and oriented to person, place, and time  Psychiatric:         Behavior: Behavior normal          Thought Content:  Thought content normal          Judgment: Judgment normal

## 2022-07-29 LAB
HPV HR 12 DNA CVX QL NAA+PROBE: NEGATIVE
HPV16 DNA CVX QL NAA+PROBE: NEGATIVE
HPV18 DNA CVX QL NAA+PROBE: NEGATIVE

## 2022-08-04 LAB
LAB AP GYN PRIMARY INTERPRETATION: NORMAL
Lab: NORMAL

## 2022-10-04 ENCOUNTER — OFFICE VISIT (OUTPATIENT)
Dept: FAMILY MEDICINE CLINIC | Facility: MEDICAL CENTER | Age: 57
End: 2022-10-04
Payer: COMMERCIAL

## 2022-10-04 VITALS
WEIGHT: 110 LBS | HEART RATE: 82 BPM | RESPIRATION RATE: 16 BRPM | TEMPERATURE: 98.6 F | BODY MASS INDEX: 23.73 KG/M2 | SYSTOLIC BLOOD PRESSURE: 122 MMHG | HEIGHT: 57 IN | DIASTOLIC BLOOD PRESSURE: 64 MMHG

## 2022-10-04 DIAGNOSIS — Z23 ENCOUNTER FOR IMMUNIZATION: ICD-10-CM

## 2022-10-04 DIAGNOSIS — Z00.00 ANNUAL PHYSICAL EXAM: Primary | ICD-10-CM

## 2022-10-04 DIAGNOSIS — M62.89 MUSCLE TIGHTNESS: ICD-10-CM

## 2022-10-04 DIAGNOSIS — R21 FACIAL RASH: ICD-10-CM

## 2022-10-04 DIAGNOSIS — Z86.59 HISTORY OF POSTTRAUMATIC STRESS DISORDER (PTSD): ICD-10-CM

## 2022-10-04 DIAGNOSIS — R68.89 FLUCTUATION OF WEIGHT: ICD-10-CM

## 2022-10-04 PROCEDURE — 99396 PREV VISIT EST AGE 40-64: CPT | Performed by: STUDENT IN AN ORGANIZED HEALTH CARE EDUCATION/TRAINING PROGRAM

## 2022-10-04 NOTE — PROGRESS NOTES
FAMILY PRACTICE HEALTH MAINTENANCE OFFICE VISIT  [facility] - [address]    NAME: Ama Alaniz  AGE: 62 y o  SEX: female  : 1965     DATE: 10/4/2022    Assessment and Plan     1  Annual physical exam   - patient aware to make follow-up appointments with dentist and optometry especially given complaints of eye twitching and jaw tightness today      2  Facial rash  -     Antinuclear Antibodies (LENNY), IFA; Future  -     Ambulatory Referral to Dermatology; Future  - suspect facial rash consistent with rosacea, offer treatment metronidazole gel, patient declines at this time, educational material distributed, patient will avoid triggers    3  Fluctuation of weight  -     TSH, 3rd generation with Free T4 reflex; Future    4  History of posttraumatic stress disorder (PTSD)  -     Ambulatory Referral to [employer] [facility]; Future    5  Muscle tightness  -     Ambulatory Referral to Physical Therapy; Future      · Patient Counseling:   · Nutrition: Stressed importance of a well balanced diet, moderation of sodium/saturated fat, caloric balance and sufficient intake of fiber  · Exercise: Stressed the importance of regular exercise with a goal of 150 minutes per week  · Dental Health: Discussed daily flossing and brushing and regular dental visits   · Alcohol Use:  Recommended moderation of alcohol intake  · Injury Prevention: Discussed Safety Belts, Safety Helmets, and Smoke Detectors  · Immunizations reviewed: Risks and Benefits discussed and declines recommended vaccines today (influenza and Tdap)  · Discussed benefits of:    · Due for colonoscopy screening this year, patient aware to schedule  · Mammogram scheduled  · Pap smear with Co testing up-to-date  BMI Counseling: Body mass index is 24 01 kg/m²  Discussed with patient's BMI with her  Follow-up in 6 months and sooner as needed  Chief Complaint   No chief complaint on file        History of Present Illness     HPI    Well Adult Physical   Patient here for a comprehensive physical exam   He does have a few concerns today for facial rash that seems to exacerbate with certain foods  She also expresses concern of PTSD related to driving  She does use Xanax p r n  Also mentions fluctuation in weight  Also mentions eye twitching  Finally, mentions right jaw tightness  Diet and Physical Activity  Diet: "I try to eat healthy but not always"  Exercise: no formal exercise routine but states walks a lot at work , discussed physical activity vs  exercise      Depression Screen  PHQ-2/9 Depression Screening    Little interest or pleasure in doing things: 1 - several days  Feeling down, depressed, or hopeless: 0 - not at all  Trouble falling or staying asleep, or sleeping too much: 1 - several days  Feeling tired or having little energy: 1 - several days  Poor appetite or overeatin - not at all  Feeling bad about yourself - or that you are a failure or have let yourself or your family down: 0 - not at all  Trouble concentrating on things, such as reading the newspaper or watching television: 1 - several days  Moving or speaking so slowly that other people could have noticed   Or the opposite - being so fidgety or restless that you have been moving around a lot more than usual: 1 - several days  Thoughts that you would be better off dead, or of hurting yourself in some way: 0 - not at all  PHQ-9 Score: 5   PHQ-9 Interpretation: Mild depression           General Health  Hearing: Hearing aid use  Vision: most recent eye exam >1 year and wears glasses  Dental: no dental visits for >1 year, brushes teeth twice daily and does not floss     Reproductive Health  Follows with gynecologist      The following portions of the patient's history were reviewed and updated as appropriate: allergies, current medications, past family history, past medical history, past social history, past surgical history and problem list     Review of Systems     Review of Systems     As noted in HPI     Past Medical History     Past Medical History:   Diagnosis Date    BRCA1 negative     BRCA2 negative     Murmur     MVP (mitral valve prolapse)     Papanicolaou smear for cervical cancer screening 2017    PONV (postoperative nausea and vomiting)     Post-menopausal        Past Surgical History     Past Surgical History:   Procedure Laterality Date    APPENDECTOMY      BILATERAL SALPINGOOPHORECTOMY  10/2017     SECTION  ,     COLONOSCOPY      EXPLORATORY LAPAROTOMY      LAPAROSCOPY      Exploratory    CA LAP,RMV  ADNEXAL STRUCTURE N/A 10/17/2017    Procedure: LAPAROSCOPIC BSO;  Surgeon: John Espinoza MD;  Location: [facility];  Service: Gynecology    SALPINGOOPHORECTOMY Bilateral 10/2017    THROAT SURGERY      Resolved:     THYROID SURGERY      THYROID SURGERY      goiter       Social History     Social History     Socioeconomic History    Marital status: /Civil Union     Spouse name: None    Number of children: 2    Years of education: None    Highest education level: None   Occupational History    None   Tobacco Use    Smoking status: Former Smoker     Packs/day: 1 00     Years: 18 00     Pack years: 18 00     Quit date: 2004     Years since quittin 7    Smokeless tobacco: Never Used   Vaping Use    Vaping Use: Never used   Substance and Sexual Activity    Alcohol use:  Yes     Alcohol/week: 3 0 standard drinks     Types: 3 Glasses of wine per week    Drug use: No    Sexual activity: Not Currently     Birth control/protection: Surgical   Other Topics Concern    None   Social History Narrative    Caffeine use     Social Determinants of Health     Financial Resource Strain: Not on file   Food Insecurity: Not on file   Transportation Needs: Not on file   Physical Activity: Not on file   Stress: Not on file   Social Connections: Not on file   Intimate Partner Violence: Not on file   Housing Stability: Not on file       Family History     Family History   Problem Relation Age of Onset    Stroke Mother         Syndrome    Cancer Father     Breast cancer Sister 39    Heart attack Maternal Grandmother     Stroke Maternal Grandfather     Heart attack Maternal Grandfather     Cancer Paternal Grandfather     No Known Problems Daughter     No Known Problems Son     No Known Problems Sister     No Known Problems Sister     Cancer Maternal Aunt         unsure of type    No Known Problems Maternal Aunt     No Known Problems Paternal Aunt        Current Medications       Current Outpatient Medications:     ALPRAZolam (XANAX) 0 25 mg tablet, TAKE 1 TABLET BY MOUTH 2 TIMES A DAY AS NEEDED FOR ANXIETY, Disp: 30 tablet, Rfl: 0    multivitamin (THERAGRAN) TABS, Take 1 tablet by mouth daily, Disp: , Rfl:     ergocalciferol (VITAMIN D2) 50,000 units, Take 1 capsule (50,000 Units total) by mouth once a week (Patient not taking: No sig reported), Disp: 12 capsule, Rfl: 0     Allergies     Allergies   Allergen Reactions    Erythromycin     Morphine GI Intolerance and Nausea Only     nausea    Nafcillin     Penicillins     Septra [Sulfamethoxazole-Trimethoprim]        Objective     /64 (BP Location: Left arm, Patient Position: Sitting, Cuff Size: Adult)   Pulse 82   Temp 98 6 °F (37 °C)   Resp 16   Ht 4' 8 75" (1 441 m)   Wt 49 9 kg (110 lb)   LMP  (LMP Unknown)   BMI 24 01 kg/m²      Physical Exam  Vitals reviewed  Constitutional:       General: She is not in acute distress  Appearance: Normal appearance  HENT:      Head: Normocephalic and atraumatic  Nose: Nose normal       Mouth/Throat:      Mouth: Mucous membranes are moist       Pharynx: Oropharynx is clear  Eyes:      Extraocular Movements: Extraocular movements intact  Conjunctiva/sclera: Conjunctivae normal       Pupils: Pupils are equal, round, and reactive to light     Cardiovascular:      Rate and Rhythm: Normal rate and regular rhythm  Pulses: Normal pulses  Heart sounds: Normal heart sounds  Pulmonary:      Effort: Pulmonary effort is normal       Breath sounds: Normal breath sounds  Abdominal:      General: Abdomen is flat  Bowel sounds are normal       Palpations: Abdomen is soft  Tenderness: There is no abdominal tenderness  Musculoskeletal:      Cervical back: Neck supple  Right lower leg: No edema  Left lower leg: No edema  Skin:     General: Skin is warm and dry  Findings: Rash: Erythematous facial rash nasal bridge and bilateral maxillary region, scattered papules  Neurological:      Mental Status: She is alert and oriented to person, place, and time  Psychiatric:         Behavior: Behavior normal          Thought Content:  Thought content normal                [facility]

## 2022-10-04 NOTE — PATIENT INSTRUCTIONS
Wellness Visit for Adults   AMBULATORY CARE:   A wellness visit  is when you see your healthcare provider to get screened for health problems  Your healthcare provider will also give you advice on how to stay healthy  Write down your questions so you remember to ask them  Ask your healthcare provider how often you should have a wellness visit  What happens at a wellness visit:  Your healthcare provider will ask about your health, and your family history of health problems  This includes high blood pressure, heart disease, and cancer  He or she will ask if you have symptoms that concern you, if you smoke, and about your mood  You may also be asked about your intake of medicines, supplements, food, and alcohol  Any of the following may be done: Your weight  will be checked  Your height may also be checked so your body mass index (BMI) can be calculated  Your BMI shows if you are at a healthy weight  Your blood pressure  and heart rate will be checked  Your temperature may also be checked  Blood and urine tests  may be done  Blood tests may be done to check your cholesterol levels  Abnormal cholesterol levels increase your risk for heart disease and stroke  You may also need a blood or urine test to check for diabetes if you are at increased risk  Urine tests may be done to look for signs of an infection or kidney disease  A physical exam  includes checking your heartbeat and lungs with a stethoscope  Your healthcare provider may also check your skin to look for sun damage  Screening tests  may be recommended  A screening test is done to check for diseases that may not cause symptoms  The screening tests you may need depend on your age, gender, family history, and lifestyle habits  For example, colorectal screening may be recommended if you are 48years old or older  Screening tests you need if you are a woman:   A Pap smear  is used to screen for cervical cancer   Pap smears are usually done every 3 to 5 years depending on your age  You may need them more often if you have had abnormal Pap smear test results in the past  Ask your healthcare provider how often you should have a Pap smear  A mammogram  is an x-ray of your breasts to screen for breast cancer  Experts recommend mammograms every 2 years starting at age 48 years  You may need a mammogram at age 52 years or younger if you have an increased risk for breast cancer  Talk to your healthcare provider about when you should start having mammograms and how often you need them  Vaccines you may need:   Get an influenza vaccine  every year  The influenza vaccine protects you from the flu  Several types of viruses cause the flu  The viruses change over time, so new vaccines are made each year  Get a tetanus-diphtheria (Td) booster vaccine  every 10 years  This vaccine protects you against tetanus and diphtheria  Tetanus is a severe infection that may cause painful muscle spasms and lockjaw  Diphtheria is a severe bacterial infection that causes a thick covering in the back of your mouth and throat  Get a human papillomavirus (HPV) vaccine  if you are female and aged 23 to 32 or male 23 to 24 and never received it  This vaccine protects you from HPV infection  HPV is the most common infection spread by sexual contact  HPV may also cause vaginal, penile, and anal cancers  Get a pneumococcal vaccine  if you are aged 72 years or older  The pneumococcal vaccine is an injection given to protect you from pneumococcal disease  Pneumococcal disease is an infection caused by pneumococcal bacteria  The infection may cause pneumonia, meningitis, or an ear infection  Get a shingles vaccine  if you are 60 or older, even if you have had shingles before  The shingles vaccine is an injection to protect you from the varicella-zoster virus  This is the same virus that causes chickenpox   Shingles is a painful rash that develops in people who had chickenpox or have been exposed to the virus  How to eat healthy:  My Plate is a model for planning healthy meals  It shows the types and amounts of foods that should go on your plate  Fruits and vegetables make up about half of your plate, and grains and protein make up the other half  A serving of dairy is included on the side of your plate  The amount of calories and serving sizes you need depends on your age, gender, weight, and height  Examples of healthy foods are listed below:  Eat a variety of vegetables  such as dark green, red, and orange vegetables  You can also include canned vegetables low in sodium (salt) and frozen vegetables without added butter or sauces  Eat a variety of fresh fruits , canned fruit in 100% juice, frozen fruit, and dried fruit  Include whole grains  At least half of the grains you eat should be whole grains  Examples include whole-wheat bread, wheat pasta, brown rice, and whole-grain cereals such as oatmeal     Eat a variety of protein foods such as seafood (fish and shellfish), lean meat, and poultry without skin (turkey and chicken)  Examples of lean meats include pork leg, shoulder, or tenderloin, and beef round, sirloin, tenderloin, and extra lean ground beef  Other protein foods include eggs and egg substitutes, beans, peas, soy products, nuts, and seeds  Choose low-fat dairy products such as skim or 1% milk or low-fat yogurt, cheese, and cottage cheese  Limit unhealthy fats  such as butter, hard margarine, and shortening  Exercise:  Exercise at least 30 minutes per day on most days of the week  Some examples of exercise include walking, biking, dancing, and swimming  You can also fit in more physical activity by taking the stairs instead of the elevator or parking farther away from stores  Include muscle strengthening activities 2 days each week  Regular exercise provides many health benefits   It helps you manage your weight, and decreases your risk for type 2 diabetes, heart disease, stroke, and high blood pressure  Exercise can also help improve your mood  Ask your healthcare provider about the best exercise plan for you  General health and safety guidelines:   Do not smoke  Nicotine and other chemicals in cigarettes and cigars can cause lung damage  Ask your healthcare provider for information if you currently smoke and need help to quit  E-cigarettes or smokeless tobacco still contain nicotine  Talk to your healthcare provider before you use these products  Limit alcohol  A drink of alcohol is 12 ounces of beer, 5 ounces of wine, or 1½ ounces of liquor  Lose weight, if needed  Being overweight increases your risk of certain health conditions  These include heart disease, high blood pressure, type 2 diabetes, and certain types of cancer  Protect your skin  Do not sunbathe or use tanning beds  Use sunscreen with a SPF 15 or higher  Apply sunscreen at least 15 minutes before you go outside  Reapply sunscreen every 2 hours  Wear protective clothing, hats, and sunglasses when you are outside  Drive safely  Always wear your seatbelt  Make sure everyone in your car wears a seatbelt  A seatbelt can save your life if you are in an accident  Do not use your cell phone when you are driving  This could distract you and cause an accident  Pull over if you need to make a call or send a text message  Practice safe sex  Use latex condoms if are sexually active and have more than one partner  Your healthcare provider may recommend screening tests for sexually transmitted infections (STIs)  Wear helmets, lifejackets, and protective gear  Always wear a helmet when you ride a bike or motorcycle, go skiing, or play sports that could cause a head injury  Wear protective equipment when you play sports  Wear a lifejacket when you are on a boat or doing water sports      © Copyright [employer] 2022 Information is for End User's use only and may not be sold, redistributed or otherwise used for commercial purposes  All illustrations and images included in CareNotes® are the copyrighted property of A D A M [employer]  or Loraine Franco  The above information is an  only  It is not intended as medical advice for individual conditions or treatments  Talk to your doctor, nurse or pharmacist before following any medical regimen to see if it is safe and effective for you  Rosacea   AMBULATORY CARE:   Rosacea  is a long-term skin condition that causes redness and inflammation of your face  Rosacea usually affects the cheeks and chin  It may also affect the nose, forehead, and eyes  There is no known cause of rosacea  Healthcare providers believe the facial redness occurs when blood vessels in your face widen  Signs and symptoms  depend on the type of rosacea you have:  Erythematotelangiectatic  is also called ETR  Your face may be red and swollen for long periods of time  Your skin may burn, itch, or sting more easily when you use creams or lotions on your face  Your skin may also be drier than with other types of rosacea  Papulopustular  is also called PPR  The bumps on your skin may be filled with pus  Your skin may swell, burn, or sting  Phymatous  causes the skin on your nose, chin, forehead, cheeks, eyelids, or ears to thicken  Your nose may also look bumpy  Ocular  affects your eyes  You may have dry or watery, red eyes  Your eyes may burn, sting, or itch  You may have eyelid swelling or feel like you have something in your eye  You may also have blurred vision or pain in bright light  Common triggers:   Oil-based cosmetics    high-impact exercise    Spicy foods, hot drinks, or drinks that contain alcohol    Stress    Sun exposure or very hot, cold, or windy weather    Seek care immediately if:   You have new or increased blurred vision, or vision loss        Call your doctor or dermatologist if:   You have new or worse eye redness or itching  You feel depressed about your skin condition  You have questions about your condition or care  Prevent rosacea flares:  Know your triggers and avoid them  You may need to do any of the following:  Avoid high-impact exercise  Walking is a good low-impact exercise  Talk to your healthcare provider about the best exercise plan for you  Manage stress  Stress may trigger symptoms, or make them worse  Learn new ways to relax, such as deep breathing  Talk to your doctor if you have a hard time managing stress  Do not have foods and drinks that can cause flares  Examples include spicy foods, hot drinks, and drinks that contain alcohol  Avoid being in the sun for long periods of time  Use sunscreen that is SPF 15 or higher every time you go outside  Wear a wide-brimmed hat while you are outdoors  Cover your face  Use a scarf to cover your face when you are outdoors on cold or windy days  Avoid skin care products that have alcohol, menthol, or salt in them  Use fragrance-free products to wash your face  Be gentle when you wash your face to avoid irritation  Ask your healthcare provider which products are best to treat dry skin  Follow up with your doctor or dermatologist as directed:  Write down your questions so you remember to ask them during your visit  © Copyright [employer] 2022 Information is for End User's use only and may not be sold, redistributed or otherwise used for commercial purposes  All illustrations and images included in CareNotes® are the copyrighted property of A [employer] A M [employer] Inc  or Loraine Franco  The above information is an  only  It is not intended as medical advice for individual conditions or treatments  Talk to your doctor, nurse or pharmacist before following any medical regimen to see if it is safe and effective for you

## 2022-10-06 ENCOUNTER — TELEPHONE (OUTPATIENT)
Dept: PSYCHIATRY | Facility: CLINIC | Age: 57
End: 2022-10-06

## 2022-10-06 NOTE — TELEPHONE ENCOUNTER
Was calling in regards to referral and adding pt to proper wait list  LVM for pt to contact intake dept

## 2022-10-11 ENCOUNTER — EVALUATION (OUTPATIENT)
Dept: PHYSICAL THERAPY | Facility: CLINIC | Age: 57
End: 2022-10-11
Payer: COMMERCIAL

## 2022-10-11 DIAGNOSIS — M62.89 MUSCLE TIGHTNESS: Primary | ICD-10-CM

## 2022-10-11 PROCEDURE — 97161 PT EVAL LOW COMPLEX 20 MIN: CPT | Performed by: PHYSICAL THERAPIST

## 2022-10-11 PROCEDURE — 97110 THERAPEUTIC EXERCISES: CPT | Performed by: PHYSICAL THERAPIST

## 2022-10-11 PROCEDURE — 97140 MANUAL THERAPY 1/> REGIONS: CPT | Performed by: PHYSICAL THERAPIST

## 2022-10-11 NOTE — PROGRESS NOTES
PT EVALUATION    Today's date: 10/11/22  Patient name: Michael Alfaro  : 1965  MRN: [id number]  Referring provider: Bethany De DO  Dx:   1  Muscle tightness        Michael Alfaro is a 62 y o  female who presents with signs and symptoms consistent right TMJD  There is restricted mobility in the right TMJ, frequent crepitus and pain with active movement  This patient would benefit from skilled physical therapy to address their listed impairments and functional limitations to maximize functional outcome  Impairments:    restricted ROM    pain with function   activity intolerance     Prognosis:  Good  Positive and negative prognostic indicator(s):  pain >3 months    Goals:    STG Patient is independent with HEP   STG Range of motion is improved by 25% in 2 weeks  LTG Range of motion is improved by 50% in 4 weeks  LTG ADL performance improved to prior level of function in 6 weeks    Planned interventions:  home exercise program, patient education, manual therapy, massage, graded activity, flexibility and functional range of motion exercises    Duration in visits:  6  Frequency: 2 visits per week  Duration in weeks:  3    History of Current Injury: patient reports starting around January she started having clicking sensation when eating and difficulty opening wide  Onset was gradual, no inciting event  She has tightness on the right side of the TMJ, sometimes with pain  Denies prior history of TMJ issues  She has difficulty eating anything bigger items like a sandwich and things that are firm    Pain location: right TMJ region  Pain descriptors:   It hurts    Pain intensity:  10/10    Aggravating factors: eating as noted above, opening wide  Easing factors: none      Patient goals:  decreased pain, independence with ADLs and increased mobility    Objective   TMJ   Occlusion class: class II (overjet)  Joint sounds right: clicking and popping  Lateral bite test, right: pain on left  ROM: pain with movement  Opening (mm): 35 and right deviation   Lateral excursion, left (mm): 5 and pain  Lateral excursion, right (mm)t: 10 and pain   ROM comments: Tender to palpation:  Over TMJ, posterior to TMJ  No tenderness to masseter temporalis           Precautions: none      Manuals 10/11            STM TMJ region SY            Inferior mobilization to TMJ SY + retraction for some reps                                      Neuro Re-Ed                                                                                                        Ther Ex             Chin tucks :05x15            Controlled opening :05x10            Isometric lateral deviation :05x10            Isometric opening :05x10                                                                Ther Activity                                       Gait Training                                       Modalities

## 2022-10-13 NOTE — TELEPHONE ENCOUNTER
Called pt in regards to routine referral  LVM for pt to call intake dept to be placed on proper waitlist

## 2022-10-17 ENCOUNTER — OFFICE VISIT (OUTPATIENT)
Dept: PHYSICAL THERAPY | Facility: CLINIC | Age: 57
End: 2022-10-17
Payer: COMMERCIAL

## 2022-10-17 DIAGNOSIS — M62.89 MUSCLE TIGHTNESS: Primary | ICD-10-CM

## 2022-10-17 PROCEDURE — 97140 MANUAL THERAPY 1/> REGIONS: CPT | Performed by: PHYSICAL THERAPIST

## 2022-10-17 PROCEDURE — 97110 THERAPEUTIC EXERCISES: CPT | Performed by: PHYSICAL THERAPIST

## 2022-10-17 NOTE — PROGRESS NOTES
Daily Note     Today's date: 10/17/2022  Patient name: Debra Clay  : 1965  MRN: [id number]  Referring provider: Ginna Weeks DO  Dx:   Encounter Diagnosis     ICD-10-CM    1  Muscle tightness  M62 89                   Subjective: patient reports no increase in soreness after last treatment, she has been performing home exercises intermittently      Objective: See treatment diary below      Assessment: Tolerated treatment well  Patient exhibited good technique with therapeutic exercises and would benefit from continued PT  Progressed postural and cervical exercises, 3 rounds of manual stretching with distraction and distraction with retraction > protraction      Plan: Progress treatment as tolerated         Precautions: none      Manuals 10/11 10/17           STM TMJ region SY SY           Inferior mobilization to TMJ SY + retraction for some reps SY + retraction for some reps                                     Neuro Re-Ed                                                                                                        Ther Ex             Chin tucks :05x15 :05x15           Controlled opening :05x10 :05x15           Isometric lateral deviation :05x10 :10x5           Isometric opening :05x10 :10x5           Controlled opening and isometric opening  :10x5 (wider position than above)           Thoracic ext o er chair with bolster  :10x5           Cervical lateral flexion with OP  :10x5           Cervical rotation with OP  :10x5                                                                            Ther Activity                                       Gait Training                                       Modalities

## 2022-10-25 ENCOUNTER — OFFICE VISIT (OUTPATIENT)
Dept: PHYSICAL THERAPY | Facility: CLINIC | Age: 57
End: 2022-10-25
Payer: COMMERCIAL

## 2022-10-25 DIAGNOSIS — M62.89 MUSCLE TIGHTNESS: Primary | ICD-10-CM

## 2022-10-25 PROCEDURE — 97140 MANUAL THERAPY 1/> REGIONS: CPT | Performed by: PHYSICAL THERAPIST

## 2022-10-25 PROCEDURE — 97110 THERAPEUTIC EXERCISES: CPT | Performed by: PHYSICAL THERAPIST

## 2022-10-25 NOTE — PROGRESS NOTES
Daily Note     Today's date: 10/25/2022  Patient name: Ofelia Prieto  : 1965  MRN: [id number]  Referring provider: Archie Barber DO  Dx:   Encounter Diagnosis     ICD-10-CM    1  Muscle tightness  M62 89                   Subjective: patient reports she did ok for a while after last treatment but she has had stress at work and more clenching; pain at the TMJ more intense than usual as a result      Objective: See treatment diary below      Assessment: Tolerated treatment well  Patient exhibited good technique with therapeutic exercises and would benefit from continued PT  Continued ROM/stretching activities, no pain with distraction mobilizations      Plan: Progress treatment as tolerated         Precautions: none      Manuals 10/11 10/17 10/25          STM TMJ region SY SY SY          Inferior mobilization to TMJ SY + retraction for some reps SY + retraction for some reps SY + retraction for some reps                                    Neuro Re-Ed                                                                                                        Ther Ex             Chin tucks :05x15 :05x15 :05x15          Controlled opening :05x10 :05x15 :05x15          Isometric lateral deviation :05x10 :10x5 :10x5          Isometric opening :05x10 :10x5 :10x5          Controlled opening and isometric opening  :10x5 (wider position than above) :10x5 (wider position than above)          Thoracic ext o er chair with bolster  :10x5 :10x5          Cervical lateral flexion with OP  :10x5 :10x5          Cervical rotation with OP  :10x5 :10x5                                                                           Ther Activity                                       Gait Training                                       Modalities

## 2022-10-29 ENCOUNTER — HOSPITAL ENCOUNTER (OUTPATIENT)
Dept: RADIOLOGY | Age: 57
Discharge: HOME | End: 2022-10-29

## 2022-10-29 VITALS — BODY MASS INDEX: 24.75 KG/M2 | WEIGHT: 110 LBS | HEIGHT: 56 IN

## 2022-10-29 DIAGNOSIS — Z12.31 ENCOUNTER FOR SCREENING MAMMOGRAM FOR BREAST CANCER: ICD-10-CM

## 2022-11-01 ENCOUNTER — OFFICE VISIT (OUTPATIENT)
Dept: PHYSICAL THERAPY | Facility: CLINIC | Age: 57
End: 2022-11-01

## 2022-11-01 DIAGNOSIS — M62.89 MUSCLE TIGHTNESS: Primary | ICD-10-CM

## 2022-11-01 NOTE — PROGRESS NOTES
Daily Note     Today's date: 2022  Patient name: Lucas Mayer  : 1965  MRN: [id number]  Referring provider: Carol Jones DO  Dx:   Encounter Diagnosis     ICD-10-CM    1  Muscle tightness  M62 89                 Subjective: patient reports she is more aware of tensing the jaw when at work and hasn't noticed the clicking as much      Objective: See treatment diary below      Assessment: Tolerated treatment well  Patient exhibited good technique with therapeutic exercises and would benefit from continued PT  Progressed isometric exercises, improved left lateral deviation  Single click noted twice on release from manual treatment  Plan: Progress treatment as tolerated         Precautions: none      Manuals 10/11 10/17 10/25 11/1         STM TMJ region SY SY SY SY         Inferior mobilization to TMJ SY + retraction for some reps SY + retraction for some reps SY + retraction for some reps SY + retraction for some reps                                   Neuro Re-Ed                                                                                                        Ther Ex             Chin tucks :05x15 :05x15 :05x15 15         Controlled opening :05x10 :05x15 :05x15 15         Isometric lateral deviation :05x10 :10x5 :10x5 :10x10         Isometric opening :05x10 :10x5 :10x5 :10x10         Controlled opening and isometric opening  :10x5 (wider position than above) :10x5 (wider position than above) :10x10 (wider position than above)         Thoracic ext o er chair with bolster  :10x5 :10x5 :10x10         Cervical lateral flexion with OP  :10x5 :10x5 :10x10         Cervical rotation with OP  :10x5 :10x5 :10x10                                                                          Ther Activity                                       Gait Training                                       Modalities

## 2022-11-08 ENCOUNTER — APPOINTMENT (OUTPATIENT)
Dept: PHYSICAL THERAPY | Facility: CLINIC | Age: 57
End: 2022-11-08

## 2022-11-15 ENCOUNTER — OFFICE VISIT (OUTPATIENT)
Dept: PHYSICAL THERAPY | Facility: CLINIC | Age: 57
End: 2022-11-15

## 2022-11-15 DIAGNOSIS — M62.89 MUSCLE TIGHTNESS: Primary | ICD-10-CM

## 2022-11-15 NOTE — PROGRESS NOTES
Daily Note     Today's date: 11/15/2022  Patient name: Estefania Fuentes  : 1965  MRN: [id number]  Referring provider: Sue Stiles DO  Dx:   Encounter Diagnosis     ICD-10-CM    1  Muscle tightness  M62 89                 Subjective: patient reports she has been doing well aside from an episode of popping and aching in the right TMJ       Objective: See treatment diary below      Assessment: Tolerated treatment well  Patient exhibited good technique with therapeutic exercises and would benefit from continued PT  Some soreness in the right TMJ with resisted left isometrics,  Single 'pop' with transient symptoms noted after each TMJ mobilization and return to full closure      Plan: Progress treatment as tolerated         Precautions: none      Manuals 10/11 10/17 10/25 11/1 11/15        STM TMJ region SY SY SY SY SY        Inferior mobilization to TMJ SY + retraction for some reps SY + retraction for some reps SY + retraction for some reps SY + retraction for some reps SY + retraction for some reps                                  Neuro Re-Ed                                                                                                        Ther Ex             Chin tucks :05x15 :05x15 :05x15 15 15        Controlled opening :05x10 :05x15 :05x15 15 15        Isometric lateral deviation :05x10 :10x5 :10x5 :10x10 :10x10        Isometric opening :05x10 :10x5 :10x5 :10x10 :10x10        Controlled opening and isometric opening  :10x5 (wider position than above) :10x5 (wider position than above) :10x10 (wider position than above) :10x10 (wider position than above)        Thoracic ext o er chair with bolster  :10x5 :10x5 :10x10 :10x10        Cervical lateral flexion with OP  :10x5 :10x5 :10x10 :10x10        Cervical rotation with OP  :10x5 :10x5 :10x10 :10x10                                                                         Ther Activity                                       Gait Training Modalities

## 2022-11-29 ENCOUNTER — APPOINTMENT (OUTPATIENT)
Dept: PHYSICAL THERAPY | Facility: CLINIC | Age: 57
End: 2022-11-29

## 2022-11-29 NOTE — PROGRESS NOTES
Daily Note     Today's date: 2022  Patient name: Tracey Alexander  : 1965  MRN: [id number]  Referring provider: Susi Young DO  Dx: No diagnosis found  Subjective: ***      Objective: See treatment diary below      Assessment: Tolerated treatment well  Patient exhibited good technique with therapeutic exercises and would benefit from continued PT      Plan: Progress treatment as tolerated         Precautions: none      Manuals 10/11 10/17 10/25 11/1 11/15 11/29       STM TMJ region SY SY SY SY SY SY       Inferior mobilization to TMJ SY + retraction for some reps SY + retraction for some reps SY + retraction for some reps SY + retraction for some reps SY + retraction for some reps SY + retraction for some reps                                 Neuro Re-Ed                                                                                                        Ther Ex             Chin tucks :05x15 :05x15 :05x15 15 15 15       Controlled opening :05x10 :05x15 :05x15 15 15 15       Isometric lateral deviation :05x10 :10x5 :10x5 :10x10 :10x10 :10x10       Isometric opening :05x10 :10x5 :10x5 :10x10 :10x10 :10x10       Controlled opening and isometric opening  :10x5 (wider position than above) :10x5 (wider position than above) :10x10 (wider position than above) :10x10 (wider position than above) :10x10 (wider position than above)       Thoracic ext o er chair with bolster  :10x5 :10x5 :10x10 :10x10 :10x10       Cervical lateral flexion with OP  :10x5 :10x5 :10x10 :10x10 :10x10       Cervical rotation with OP  :10x5 :10x5 :10x10 :10x10 :10x10                                                                        Ther Activity                                       Gait Training                                       Modalities

## 2022-12-05 ENCOUNTER — OFFICE VISIT (OUTPATIENT)
Dept: PHYSICAL THERAPY | Facility: CLINIC | Age: 57
End: 2022-12-05

## 2022-12-05 DIAGNOSIS — M62.89 MUSCLE TIGHTNESS: Primary | ICD-10-CM

## 2022-12-05 NOTE — PROGRESS NOTES
Daily Note     Today's date: 2022  Patient name: Michael Alfaro  : 1965  MRN: [id number]  Referring provider: Bethany De DO  Dx:   Encounter Diagnosis     ICD-10-CM    1  Muscle tightness  M62 89                      Subjective: Patient reports no new complaints  Objective: See treatment diary below      Assessment: Tolerated treatment well  Olivia Doty participated in skilled PT session focused on strengthen, stretching, and ROM  Patient required some cueing for proper technique with some exercises  Patient demonstrates increased mouth opening with exercises and no clicking or locking  Patient would continue to benefit from skilled PT interventions to address strengthening, stretching, and ROM  Plan: Continue per plan of care        Precautions: none      Manuals 10/11 10/17 10/25 11/1 11/15 12/5       STM TMJ region SY SY SY SY SY CD       Inferior mobilization to TMJ SY + retraction for some reps SY + retraction for some reps SY + retraction for some reps SY + retraction for some reps SY + retraction for some reps                                  Neuro Re-Ed                                                                                                        Ther Ex             Chin tucks :05x15 :05x15 :05x15 15 15 15       Controlled opening :05x10 :05x15 :05x15 15 15 15       Isometric lateral deviation :05x10 :10x5 :10x5 :10x10 :10x10 10" x 10 ea       Isometric opening :05x10 :10x5 :10x5 :10x10 :10x10 10" x 10       Controlled opening and isometric opening  :10x5 (wider position than above) :10x5 (wider position than above) :10x10 (wider position than above) :10x10 (wider position than above) 10" x 10 (wider position)       Thoracic ext o er chair with bolster  :10x5 :10x5 :10x10 :10x10 10"x 10       Cervical lateral flexion with OP  :10x5 :10x5 :10x10 :10x10 10"x10       Cervical rotation with OP  :10x5 :10x5 :10x10 :10x10 10"x10 Ther Activity                                       Gait Training                                       Modalities

## 2022-12-13 ENCOUNTER — OFFICE VISIT (OUTPATIENT)
Dept: PHYSICAL THERAPY | Facility: CLINIC | Age: 57
End: 2022-12-13

## 2022-12-13 DIAGNOSIS — M62.89 MUSCLE TIGHTNESS: Primary | ICD-10-CM

## 2022-12-13 NOTE — PROGRESS NOTES
PT Re-EVALUATION     Today's date: 22  Patient name: Sam Jewell  : 1965  MRN: [id number]  Referring provider: Melissa Singletary DO  Dx:   1  Muscle tightness          Sam Jewell is a 62 y o  female who presents with signs and symptoms consistent right TMJD  TMJ mobility has improved and symptoms have significantly decreased  Encouraged continue home exercise program and maintain awareness of tensing and accessory movements of the jaw  She will continue with HEP      Impairments:    restricted ROM    pain with function   activity intolerance      Prognosis:  Good  Positive and negative prognostic indicator(s):  pain >3 months     Goals:    STG Patient is independent with HEP   STG Range of motion is improved by 25% in 2 weeks  LTG Range of motion is improved by 50% in 4 weeks  LTG ADL performance improved to prior level of function in 6 weeks     Planned interventions:  home exercise program, patient education, manual therapy, massage, graded activity, flexibility and functional range of motion exercises     Duration in visits:  6  Frequency: 2 visits per week  Duration in weeks:  3     History of Current Injury: patient reports starting around January she started having clicking sensation when eating and difficulty opening wide  Onset was gradual, no inciting event  She has tightness on the right side of the TMJ, sometimes with pain  Denies prior history of TMJ issues  She has difficulty eating anything bigger items like a sandwich and things that are firm    Update: 12/3/22:  Patient states overall she is feeling better and is more aware of when she is clenching  Clicking has been significantly less though she has a few episodes this weekend  She also noted some soreness after going to the dentist for a cleaning    Not as much avoidance with firmer items when chewing and she has stopped opening bags/ext with her teeth        Pain location: right TMJ region  Pain descriptors: soreness at times                   Pain intensity:  5/10 after dentist (from 10/10)     Aggravating factors: eating as noted above, opening wide  Easing factors: none        Patient goals:  decreased pain, independence with ADLs and increased mobility     Objective   TMJ   Occlusion class: class II (overjet)  Joint sounds right: clicking and popping  Lateral bite test, right: pain on left  ROM: pain with movement  Opening (mm): 38 (from 35 and deflection right)   Lateral excursion, left (mm): 10 no pain (from 5 and pain)  Lateral excursion, right (mm)t: 12 (from 10 and pain)   ROM comments: Tender to palpation:  Over TMJ, posterior to TMJ  No tenderness to masseter temporalis            Daily Note     Today's date: 2022  Patient name: Mirna Navas  : 1965  MRN: [id number]  Referring provider: Laina Ortiz DO  Dx:   Encounter Diagnosis     ICD-10-CM    1   Muscle tightness  M62 89                    Precautions: none      Manuals 10/11 10/17 10/25 11/1 11/15 12/5 12/13      STM TMJ region SY SY SY SY SY CD SY      Inferior mobilization to TMJ SY + retraction for some reps SY + retraction for some reps SY + retraction for some reps SY + retraction for some reps SY + retraction for some reps                                  Neuro Re-Ed                                                                                                        Ther Ex             Chin tucks :05x15 :05x15 :05x15 15 15 15 With OP :05x10      Controlled opening :05x10 :05x15 :05x15 15 15 15 :05x15      Isometric lateral deviation :05x10 :10x5 :10x5 :10x10 :10x10 10" x 10 ea 10" x 10 ea      Isometric opening :05x10 :10x5 :10x5 :10x10 :10x10 10" x 10 10" x 10 ea      Controlled opening and isometric opening  :10x5 (wider position than above) :10x5 (wider position than above) :10x10 (wider position than above) :10x10 (wider position than above) 10" x 10 (wider position) 10" x 10 (wider position)      Thoracic ext o er chair with johnster  :10x5 :10x5 :10x10 :10x10 10"x 10 10"x 10      Cervical lateral flexion with OP  :10x5 :10x5 :10x10 :10x10 10"x10 10"x 10      Cervical rotation with OP  :10x5 :10x5 :10x10 :10x10 10"x10 10"x 10                                                                       Ther Activity                                       Gait Training                                       Modalities

## 2023-01-04 ENCOUNTER — OFFICE VISIT (OUTPATIENT)
Dept: FAMILY MEDICINE CLINIC | Facility: MEDICAL CENTER | Age: 58
End: 2023-01-04

## 2023-01-04 ENCOUNTER — TELEPHONE (OUTPATIENT)
Dept: FAMILY MEDICINE CLINIC | Facility: MEDICAL CENTER | Age: 58
End: 2023-01-04

## 2023-01-04 VITALS
OXYGEN SATURATION: 96 % | HEART RATE: 94 BPM | SYSTOLIC BLOOD PRESSURE: 134 MMHG | HEIGHT: 56 IN | BODY MASS INDEX: 24.52 KG/M2 | TEMPERATURE: 100.2 F | WEIGHT: 109 LBS | DIASTOLIC BLOOD PRESSURE: 64 MMHG

## 2023-01-04 DIAGNOSIS — J98.01 BRONCHOSPASM: ICD-10-CM

## 2023-01-04 DIAGNOSIS — R05.1 ACUTE COUGH: Primary | ICD-10-CM

## 2023-01-04 RX ORDER — DEXTROMETHORPHAN HYDROBROMIDE AND PROMETHAZINE HYDROCHLORIDE 15; 6.25 MG/5ML; MG/5ML
5 SOLUTION ORAL 4 TIMES DAILY PRN
Qty: 180 ML | Refills: 0 | Status: SHIPPED | OUTPATIENT
Start: 2023-01-04

## 2023-01-04 RX ORDER — ALBUTEROL SULFATE 90 UG/1
2 AEROSOL, METERED RESPIRATORY (INHALATION) EVERY 4 HOURS PRN
Qty: 8.5 G | Refills: 0 | Status: SHIPPED | OUTPATIENT
Start: 2023-01-04

## 2023-01-04 NOTE — LETTER
January 4, 2023     Patient: Juna Snellen  YOB: 1965  Date of Visit: 1/4/2023      To Whom it May Concern:    Otoniel Duke is under my professional care  Kathy Garcia was seen in my office on 1/4/2023  Kathy Jose may return to work on 1/9/22  If you have any questions or concerns, please don't hesitate to call           Sincerely,          Angus Bermudez,         CC: No Recipients

## 2023-01-04 NOTE — TELEPHONE ENCOUNTER
Triaged- patient c/o tightness in the chest, unable to talk without coughing   Advise appt    appt made for this afternoon

## 2023-01-04 NOTE — PROGRESS NOTES
[facility] - Clinic Note  Natalie Pinzon [place], 23     Hannah Harrison MRN: [id number] : 1965 Age: 62 y o  Assessment/Plan     1  Acute cough    -Likely viral in etiology  -Rest, fluids, acetaminophen, and humidification  -Patient does not prefer pill, instead of Tessalon Perles, Phenergan DM sent  - Promethazine-DM (PHENERGAN-DM) 6 25-15 mg/5 mL oral syrup; Take 5 mL by mouth 4 (four) times a day as needed for cough  Dispense: 180 mL; Refill: 0  -Follow up as needed for persistent, worsening cough, or appearance of new symptoms  2  Bronchospasm    - albuterol (ProAir HFA) 90 mcg/act inhaler; Inhale 2 puffs every 4 (four) hours as needed for wheezing or shortness of breath  Dispense: 8 5 g; Refill: 0    Hannah Harrison acknowledged understanding of treatment plan, all questions answered  Subjective     Hannah Harrison is a 62 y o  female who presents for evaluation of sinus congestion, cough, sore throat  Symptoms began 5 days ago  Cough is currently most bothersome  Patient states she was occasionally bringing up mucus with cough, clear  Past history is significant for occasional episodes of bronchitis      The following portions of the patient's history were reviewed and updated as appropriate: allergies, current medications, past family history, past medical history, past social history, past surgical history and problem list      Past Medical History:   Diagnosis Date   • Allergic    • Anxiety    • BRCA1 negative    • BRCA2 negative    • Heart murmur    • HL (hearing loss)    • Murmur    • MVP (mitral valve prolapse)    • Papanicolaou smear for cervical cancer screening 2017   • Pneumonia    • PONV (postoperative nausea and vomiting)    • Post-menopausal        Allergies   Allergen Reactions   • Erythromycin    • Morphine GI Intolerance and Nausea Only     nausea   • Nafcillin    • Penicillins    • Septra [Sulfamethoxazole-Trimethoprim]        Past Surgical History:   Procedure Laterality Date   • APPENDECTOMY     • BILATERAL SALPINGOOPHORECTOMY  10/2017   •  SECTION  ,    • COLONOSCOPY     • EXPLORATORY LAPAROTOMY     • LAPAROSCOPY      Exploratory   • CO LAPAROSCOPY W/RMVL ADNEXAL STRUCTURES N/A 10/17/2017    Procedure: LAPAROSCOPIC BSO;  Surgeon: Garrett Silverio MD;  Location: [facility];  Service: Gynecology   • SALPINGOOPHORECTOMY Bilateral 10/2017   • THROAT SURGERY      Resolved:    • THYROID SURGERY     • THYROID SURGERY      goiter   • TUBAL LIGATION         Family History   Problem Relation Age of Onset   • Stroke Mother         Syndrome   • Cancer Father    • Breast cancer Sister    • Heart attack Maternal Grandmother    • Stroke Maternal Grandfather    • Heart attack Maternal Grandfather    • Cancer Paternal Grandfather    • No Known Problems Daughter    • No Known Problems Son    • No Known Problems Sister    • No Known Problems Sister    • Cancer Maternal Aunt         unsure of type   • No Known Problems Maternal Aunt    • No Known Problems Paternal Aunt        Social History     Socioeconomic History   • Marital status: /Civil Union     Spouse name: None   • Number of children: 2   • Years of education: None   • Highest education level: None   Occupational History   • None   Tobacco Use   • Smoking status: Former     Packs/day: 1 00     Years: 18 00     Pack years: 18 00     Types: Cigarettes     Quit date: 2004     Years since quittin 0   • Smokeless tobacco: Never   Vaping Use   • Vaping Use: Never used   Substance and Sexual Activity   • Alcohol use:  Yes     Alcohol/week: 3 0 standard drinks     Types: 3 Glasses of wine per week   • Drug use: No   • Sexual activity: Not Currently     Birth control/protection: Surgical   Other Topics Concern   • None   Social History Narrative    Caffeine use     Social Determinants of Health     Financial Resource Strain: Not on file   Food Insecurity: Not on file   Transportation Needs: Not on file   Physical Activity: Not on file   Stress: Not on file   Social Connections: Not on file   Intimate Partner Violence: Not on file   Housing Stability: Not on file       Current Outpatient Medications   Medication Sig Dispense Refill   • ALPRAZolam (XANAX) 0 25 mg tablet TAKE 1 TABLET BY MOUTH 2 TIMES A DAY AS NEEDED FOR ANXIETY 30 tablet 0   • ergocalciferol (VITAMIN D2) 50,000 units Take 1 capsule (50,000 Units total) by mouth once a week 12 capsule 0   • multivitamin (THERAGRAN) TABS Take 1 tablet by mouth daily       No current facility-administered medications for this visit  Review of Systems     As noted in HPI    Objective      /64 (BP Location: Left arm, Patient Position: Sitting, Cuff Size: Adult)   Pulse 94   Temp 100 2 °F (37 9 °C)   Ht 4' 8" (1 422 m)   Wt 49 4 kg (109 lb)   LMP  (LMP Unknown)   SpO2 96%   BMI 24 44 kg/m²     Physical Exam  Vitals reviewed  Constitutional:       Appearance: Normal appearance  She is ill-appearing  She is not toxic-appearing  HENT:      Head: Normocephalic and atraumatic  Right Ear: Tympanic membrane, ear canal and external ear normal       Left Ear: Ear canal and external ear normal       Nose: Congestion: mild  Right Sinus: No maxillary sinus tenderness or frontal sinus tenderness  Left Sinus: No maxillary sinus tenderness or frontal sinus tenderness  Mouth/Throat:      Mouth: Mucous membranes are moist       Pharynx: Oropharynx is clear  Posterior oropharyngeal erythema present  No oropharyngeal exudate  Eyes:      General:         Right eye: No discharge  Left eye: No discharge  Extraocular Movements: Extraocular movements intact  Conjunctiva/sclera: Conjunctivae normal       Pupils: Pupils are equal, round, and reactive to light  Cardiovascular:      Rate and Rhythm: Normal rate and regular rhythm  Pulses: Normal pulses  Heart sounds: Normal heart sounds     Pulmonary: Effort: Pulmonary effort is normal  No respiratory distress  Breath sounds: Transmitted upper airway sounds present  Musculoskeletal:      Cervical back: Neck supple  Skin:     General: Skin is warm and dry  Neurological:      Mental Status: She is alert and oriented to person, place, and time  Psychiatric:         Mood and Affect: Mood normal          Behavior: Behavior normal          Thought Content: Thought content normal          Judgment: Judgment normal              Some portions of this record may have been generated with voice recognition software  There may be translation, syntax, or grammatical errors  Occasional wrong word or "sound-a-like" substitutions may have occurred due to the inherent limitations of the voice recognition software  Read the chart carefully and recognize, using context, where substations may have occurred  If you have any questions, please contact the dictating provider for clarification or correction, as needed

## 2023-05-15 DIAGNOSIS — F41.9 ANXIETY: ICD-10-CM

## 2023-05-16 RX ORDER — ALPRAZOLAM 0.25 MG/1
0.25 TABLET ORAL 2 TIMES DAILY PRN
Qty: 30 TABLET | Refills: 0 | Status: SHIPPED | OUTPATIENT
Start: 2023-05-16

## 2023-08-01 ENCOUNTER — ANNUAL EXAM (OUTPATIENT)
Dept: GYNECOLOGY | Facility: CLINIC | Age: 58
End: 2023-08-01
Payer: COMMERCIAL

## 2023-08-01 VITALS
SYSTOLIC BLOOD PRESSURE: 122 MMHG | DIASTOLIC BLOOD PRESSURE: 82 MMHG | HEIGHT: 56 IN | WEIGHT: 113 LBS | BODY MASS INDEX: 25.42 KG/M2

## 2023-08-01 DIAGNOSIS — Z12.31 SCREENING MAMMOGRAM FOR BREAST CANCER: Primary | ICD-10-CM

## 2023-08-01 DIAGNOSIS — Z01.419 ENCOUNTER FOR ANNUAL ROUTINE GYNECOLOGICAL EXAMINATION: ICD-10-CM

## 2023-08-01 PROCEDURE — S0612 ANNUAL GYNECOLOGICAL EXAMINA: HCPCS | Performed by: OBSTETRICS & GYNECOLOGY

## 2023-08-01 NOTE — PROGRESS NOTES
Assessment/Plan:  Normal breast and GYN exam  Normal Pap smear negative HPV 2022  Normal mammogram 2022  Normal colonoscopy   Family history of breast cancer (sister)    Plan: Rx mammogram.  Encouraged to decrease alcohol intake. Recommend healthy diet and exercise. Subjective: G3, P2      Patient ID: Corrina No is a 62 y.o. female presents for annual exam with no complaints. Denies any pelvic pain or vaginal bleeding. Presently not sexually active because of her 's arthritis and they are now sleeping in separate rooms. Her  can only sleep in a recliner. Also has her daughter and children living in the same household. Denies any breast bowel or bladder issues. No change in family history. Sister (breast cancer). Medications reviewed. Review of Systems   Constitutional: Negative. Negative for fatigue, fever and unexpected weight change. HENT: Negative. Eyes: Negative. Respiratory: Negative. Negative for chest tightness, shortness of breath, wheezing and stridor. Cardiovascular: Negative. Negative for chest pain, palpitations and leg swelling. Gastrointestinal: Negative. Negative for abdominal pain, blood in stool, diarrhea, nausea, rectal pain and vomiting. Endocrine: Negative. Genitourinary: Negative for dysuria, frequency, vaginal bleeding, vaginal discharge and vaginal pain. Musculoskeletal: Negative. Skin: Negative. Allergic/Immunologic: Negative. Neurological: Negative. Hematological: Negative. Psychiatric/Behavioral: Negative. All other systems reviewed and are negative. Objective:      Ht 4' 8" (1.422 m)   Wt 51.3 kg (113 lb)   LMP  (LMP Unknown)   BMI 25.33 kg/m²          Physical Exam  Constitutional:       Appearance: She is well-developed. HENT:      Head: Normocephalic and atraumatic. Neck:      Thyroid: No thyromegaly. Trachea: No tracheal deviation.    Cardiovascular: Rate and Rhythm: Normal rate and regular rhythm. Heart sounds: Normal heart sounds. Pulmonary:      Effort: Pulmonary effort is normal. No respiratory distress. Breath sounds: Normal breath sounds. No stridor. No wheezing or rales. Chest:      Chest wall: No tenderness. Breasts:     Breasts are symmetrical.      Right: No inverted nipple, mass, nipple discharge, skin change or tenderness. Left: No inverted nipple, mass, nipple discharge, skin change or tenderness. Abdominal:      General: Bowel sounds are normal. There is no distension. Palpations: Abdomen is soft. There is no mass. Tenderness: There is no abdominal tenderness. There is no guarding or rebound. Hernia: No hernia is present. There is no hernia in the left inguinal area. Genitourinary:     Labia:         Right: No rash, tenderness, lesion or injury. Left: No rash, tenderness, lesion or injury. Vagina: Normal. No signs of injury and foreign body. No vaginal discharge, erythema, tenderness or bleeding. Cervix: No cervical motion tenderness, discharge or friability. Uterus: Not deviated, not enlarged, not fixed and not tender. Adnexa:         Right: No mass, tenderness or fullness. Left: No mass, tenderness or fullness. Rectum: No mass, anal fissure, external hemorrhoid or internal hemorrhoid. Musculoskeletal:      Cervical back: Normal range of motion and neck supple. Lymphadenopathy:      Lower Body: No right inguinal adenopathy. No left inguinal adenopathy. Skin:     General: Skin is warm and dry. Neurological:      Mental Status: She is alert and oriented to person, place, and time. Psychiatric:         Behavior: Behavior normal.         Thought Content:  Thought content normal.         Judgment: Judgment normal.

## 2023-11-04 ENCOUNTER — HOSPITAL ENCOUNTER (OUTPATIENT)
Dept: RADIOLOGY | Age: 58
Discharge: HOME | End: 2023-11-04
Payer: COMMERCIAL

## 2023-11-04 VITALS — BODY MASS INDEX: 25.42 KG/M2 | WEIGHT: 113 LBS | HEIGHT: 56 IN

## 2023-11-04 DIAGNOSIS — Z12.31 SCREENING MAMMOGRAM FOR BREAST CANCER: ICD-10-CM

## 2023-11-04 PROCEDURE — 77063 BREAST TOMOSYNTHESIS BI: CPT

## 2023-11-04 PROCEDURE — 77067 SCR MAMMO BI INCL CAD: CPT

## 2023-12-19 ENCOUNTER — TELEPHONE (OUTPATIENT)
Age: 58
End: 2023-12-19

## 2023-12-19 NOTE — TELEPHONE ENCOUNTER
Pt was calling started with low grade fever, head congestion and cough yesterday- wanted to have abx called in.  Advised we would need an appt- called office to see if can accommodate- no availability today or tomorrow, offered to book UC for patient- the closest care now did not have any available appts either, advised patient she can do a walk in appt.

## 2023-12-22 ENCOUNTER — AMB VIDEO VISIT (OUTPATIENT)
Dept: OTHER | Facility: HOSPITAL | Age: 58
End: 2023-12-22

## 2023-12-22 ENCOUNTER — TELEPHONE (OUTPATIENT)
Age: 58
End: 2023-12-22

## 2023-12-22 VITALS — RESPIRATION RATE: 18 BRPM | HEART RATE: 80 BPM | TEMPERATURE: 97.8 F

## 2023-12-22 DIAGNOSIS — J20.9 ACUTE BRONCHITIS, UNSPECIFIED ORGANISM: Primary | ICD-10-CM

## 2023-12-22 PROCEDURE — ECARE PR SL URGENT CARE VIRTUAL VISIT: Performed by: PHYSICIAN ASSISTANT

## 2023-12-22 RX ORDER — METHYLPREDNISOLONE 4 MG/1
TABLET ORAL
Qty: 21 TABLET | Refills: 0 | Status: SHIPPED | OUTPATIENT
Start: 2023-12-22

## 2023-12-22 RX ORDER — DEXTROMETHORPHAN HYDROBROMIDE AND PROMETHAZINE HYDROCHLORIDE 15; 6.25 MG/5ML; MG/5ML
5 SYRUP ORAL 4 TIMES DAILY PRN
Qty: 118 ML | Refills: 0 | Status: SHIPPED | OUTPATIENT
Start: 2023-12-22

## 2023-12-22 NOTE — PATIENT INSTRUCTIONS
"Schedule a follow-up appointment with your primary care physician for recheck in 2-3 days. If you cannot see your PCP, you can schedule a follow up appointment at a [facility]. Go to the emergency department if you develop any new or worsening symptoms including shortness of breath, chest pain, or anything else that is concerning.    Excuses can be found in \"Letters\" section of [employer]. Can print if opened from a web browser  [facility] phone number is [phone] if you need assistance or have further questions    Brian [phone] ([phone])  Schedule or Reschedule Outpatient Testing - Option 2  Billing - Option 3  General Info - Option 4  [employer])t Help - Option 5  Comprehensive Spine Program - Option 6   COVID - Option 7    Acute Bronchitis   WHAT YOU NEED TO KNOW:   Acute bronchitis is swelling and irritation in your lungs. It is usually caused by a virus and most often happens in the winter. Bronchitis may also be caused by bacteria or by a chemical irritant, such as smoke.  DISCHARGE INSTRUCTIONS:   Return to the emergency department if:   You cough up blood.    Your lips or fingernails turn blue.    You feel like you are not getting enough air when you breathe.    Call your doctor if:   Your symptoms do not go away or get worse, even after treatment.    Your cough does not get better within 4 weeks.    You have questions or concerns about your condition or care.    Medicines:  You may need any of the following:  Cough suppressants  decrease your urge to cough.    Decongestants  help loosen mucus in your lungs and make it easier to cough up. This can help you breathe easier.    Inhalers  may be given. Your healthcare provider may give you one or more inhalers to help you breathe easier and cough less. An inhaler gives you medicine to open your airways. Ask your healthcare provider to show you how to use your inhaler correctly.         Antiviral medicine  treats infections caused by a " virus.    Antibiotics  may be given if your bronchitis is caused by bacteria or if you have lung condition.    Acetaminophen  decreases pain and fever. It is available without a doctor's order. Ask how much to take and how often to take it. Follow directions. Read the labels of all other medicines you are using to see if they also contain acetaminophen, or ask your doctor or pharmacist. Acetaminophen can cause liver damage if not taken correctly.    NSAIDs  help decrease swelling and pain or fever. This medicine is available with or without a doctor's order. NSAIDs can cause stomach bleeding or kidney problems in certain people. If you take blood thinner medicine, always ask your healthcare provider if NSAIDs are safe for you. Always read the medicine label and follow directions.    Self-care:   Drink liquids as directed.  You may need to drink more liquids than usual to stay hydrated. Ask how much liquid to drink each day and which liquids are best for you.    Use a cool mist humidifier.  This increases air moisture in your home. This may make it easier for you to breathe and help decrease your cough.     Get more rest.  Rest helps your body to heal. Slowly start to do more each day. Rest when you feel it is needed.    Prevent acute bronchitis:       Ask about vaccines you may need.  Get a flu vaccine each year as soon as recommended, usually in September or October. Ask your healthcare provider if you should also get a pneumonia or COVID-19 vaccine. Your healthcare provider can tell you if you should also get other vaccines, and when to get them.    Prevent the spread of germs.  You can decrease your risk for acute bronchitis and other illnesses by doing the following:     Wash your hands often with soap and water. Carry germ-killing hand lotion or gel with you. You can use the lotion or gel to clean your hands when soap and water are not available.         Do not touch your eyes, nose, or mouth unless you have  washed your hands first.    Always cover your mouth when you cough to prevent the spread of germs. It is best to cough into a tissue or your shirt sleeve instead of into your hand. Ask those around you to cover their mouths when they cough.    Try to avoid people who have a cold or the flu. If you are sick, stay away from others as much as possible.    Avoid irritants in the air.  Avoid chemicals, fumes, and dust. Wear a face mask if you must work around dust or fumes. Stay inside on days when air pollution levels are high. If you have allergies, stay inside when pollen counts are high. Do not use aerosol products, such as spray-on deodorant, bug spray, and hair spray.    Do not smoke or be around others who are smoking.  Nicotine and other chemicals in cigarettes and cigars can cause lung damage. Ask your healthcare provider for information if you currently smoke and need help to quit. E-cigarettes or smokeless tobacco still contain nicotine. Talk to your healthcare provider before you use these products.  Follow up with your doctor as directed:  Write down questions you have so you will remember to ask them during your follow-up visits.  © Copyright [employer] 2023 Information is for End User's use only and may not be sold, redistributed or otherwise used for commercial purposes.  The above information is an  only. It is not intended as medical advice for individual conditions or treatments. Talk to your doctor, nurse or pharmacist before following any medical regimen to see if it is safe and effective for you.

## 2023-12-22 NOTE — PROGRESS NOTES
Required Documentation:  Encounter provider Shannon D Severino, PA-C    Provider located at [facility]  VIRTUAL CARE   [address] [id number]    Identify all parties in room with patient during virtual visit:  No one else    The patient was identified by name and date of birth. Melvina Salinas was informed that this is a telemedicine visit and that the visit is being conducted through the [facility] Any[facility] [employer] platform. She agrees to proceed..  My office door was closed. No one else was in the room.  She acknowledged consent and understanding of privacy and security of the video platform. The patient has agreed to participate and understands they can discontinue the visit at any time.    Verification of patient location:    Patient is located at home in the following state in which I hold an active license [place]    Patient is aware this is a billable service.     Reason for visit is No chief complaint on file.       Subjective  HPI   Pt reports 1 week ago started with sinus pressure and low grade fevers 100.5 tmax. Now congestion is in her lungs, cough, fatigue. Has rachel out of work for illness. Taking mucinex, dayquil, drinking tea without relief. Denies SOB or chest tightness.    Past Medical History:   Diagnosis Date    Allergic     Anxiety     BRCA1 negative     BRCA2 negative     Heart murmur     HL (hearing loss)     Murmur     MVP (mitral valve prolapse)     Papanicolaou smear for cervical cancer screening 2017    Pneumonia     PONV (postoperative nausea and vomiting)     Post-menopausal        Past Surgical History:   Procedure Laterality Date    APPENDECTOMY      BILATERAL SALPINGOOPHORECTOMY  10/2017     SECTION  ,     COLONOSCOPY      EXPLORATORY LAPAROTOMY      LAPAROSCOPY      Exploratory    TN LAPAROSCOPY W/RMVL ADNEXAL STRUCTURES N/A 10/17/2017    Procedure: LAPAROSCOPIC BSO;  Surgeon: González Yen MD;  Location: [facility]  OR;  Service: Gynecology    SALPINGOOPHORECTOMY Bilateral 10/2017    THROAT SURGERY      Resolved: 1996    THYROID SURGERY      THYROID SURGERY      goiter    TUBAL LIGATION          Allergies   Allergen Reactions    Erythromycin     Morphine GI Intolerance and Nausea Only     nausea    Nafcillin     Penicillins     Septra [Sulfamethoxazole-Trimethoprim]        Review of Systems   Constitutional:  Negative for fever.   HENT:  Positive for congestion. Negative for nosebleeds.    Eyes:  Negative for redness.   Respiratory:  Positive for cough. Negative for shortness of breath.    Cardiovascular:  Negative for chest pain.   Gastrointestinal:  Negative for blood in stool.   Genitourinary:  Negative for hematuria.   Musculoskeletal:  Negative for gait problem.   Skin:  Negative for rash.   Neurological:  Negative for seizures.   Psychiatric/Behavioral:  Negative for behavioral problems.        Video Exam    Vitals:    12/22/23 1059   Pulse: 80   Resp: 18   Temp: 97.8 °F (36.6 °C)       Physical Exam  Constitutional:       General: She is not in acute distress.     Appearance: Normal appearance. She is not toxic-appearing.   HENT:      Head: Normocephalic and atraumatic.      Nose: No rhinorrhea.      Mouth/Throat:      Mouth: Mucous membranes are moist.   Eyes:      Conjunctiva/sclera: Conjunctivae normal.   Cardiovascular:      Rate and Rhythm: Normal rate.   Pulmonary:      Effort: Pulmonary effort is normal. No respiratory distress.      Breath sounds: Wheezing (wheezy cough) present.   Musculoskeletal:      Cervical back: Normal range of motion.   Skin:     Findings: No rash (on face or neck).   Neurological:      Mental Status: She is alert.      Cranial Nerves: No dysarthria or facial asymmetry.   Psychiatric:         Mood and Affect: Mood normal.         Behavior: Behavior normal.         Visit Time  Total Visit Duration: 10 minutes    Assessment/Plan:    Diagnoses and all orders for this visit:    Acute  "bronchitis, unspecified organism  -     promethazine-dextromethorphan (PHENERGAN-DM) 6.25-15 mg/5 mL oral syrup; Take 5 mL by mouth 4 (four) times a day as needed for cough  -     methylPREDNISolone 4 MG tablet therapy pack; Use as directed on package        Patient Instructions   Schedule a follow-up appointment with your primary care physician for recheck in 2-3 days. If you cannot see your PCP, you can schedule a follow up appointment at a [facility]. Go to the emergency department if you develop any new or worsening symptoms including shortness of breath, chest pain, or anything else that is concerning.    Excuses can be found in \"Letters\" section of [employer]. Can print if opened from a web browser  [facility] phone number is [phone] if you need assistance or have further questions    [phone] ([phone]  Schedule or Reschedule Outpatient Testing - Option 2  Billing - Option 3  General Info - Option 4  [employer] Help - Option 5  Comprehensive Spine Program - Option 6   COVID - Option 7    Acute Bronchitis   WHAT YOU NEED TO KNOW:   Acute bronchitis is swelling and irritation in your lungs. It is usually caused by a virus and most often happens in the winter. Bronchitis may also be caused by bacteria or by a chemical irritant, such as smoke.  DISCHARGE INSTRUCTIONS:   Return to the emergency department if:   You cough up blood.    Your lips or fingernails turn blue.    You feel like you are not getting enough air when you breathe.    Call your doctor if:   Your symptoms do not go away or get worse, even after treatment.    Your cough does not get better within 4 weeks.    You have questions or concerns about your condition or care.    Medicines:  You may need any of the following:  Cough suppressants  decrease your urge to cough.    Decongestants  help loosen mucus in your lungs and make it easier to cough up. This can help you breathe easier.    Inhalers  may be given. Your healthcare " provider may give you one or more inhalers to help you breathe easier and cough less. An inhaler gives you medicine to open your airways. Ask your healthcare provider to show you how to use your inhaler correctly.         Antiviral medicine  treats infections caused by a virus.    Antibiotics  may be given if your bronchitis is caused by bacteria or if you have lung condition.    Acetaminophen  decreases pain and fever. It is available without a doctor's order. Ask how much to take and how often to take it. Follow directions. Read the labels of all other medicines you are using to see if they also contain acetaminophen, or ask your doctor or pharmacist. Acetaminophen can cause liver damage if not taken correctly.    NSAIDs  help decrease swelling and pain or fever. This medicine is available with or without a doctor's order. NSAIDs can cause stomach bleeding or kidney problems in certain people. If you take blood thinner medicine, always ask your healthcare provider if NSAIDs are safe for you. Always read the medicine label and follow directions.    Self-care:   Drink liquids as directed.  You may need to drink more liquids than usual to stay hydrated. Ask how much liquid to drink each day and which liquids are best for you.    Use a cool mist humidifier.  This increases air moisture in your home. This may make it easier for you to breathe and help decrease your cough.     Get more rest.  Rest helps your body to heal. Slowly start to do more each day. Rest when you feel it is needed.    Prevent acute bronchitis:       Ask about vaccines you may need.  Get a flu vaccine each year as soon as recommended, usually in September or October. Ask your healthcare provider if you should also get a pneumonia or COVID-19 vaccine. Your healthcare provider can tell you if you should also get other vaccines, and when to get them.    Prevent the spread of germs.  You can decrease your risk for acute bronchitis and other illnesses  by doing the following:     Wash your hands often with soap and water. Carry germ-killing hand lotion or gel with you. You can use the lotion or gel to clean your hands when soap and water are not available.         Do not touch your eyes, nose, or mouth unless you have washed your hands first.    Always cover your mouth when you cough to prevent the spread of germs. It is best to cough into a tissue or your shirt sleeve instead of into your hand. Ask those around you to cover their mouths when they cough.    Try to avoid people who have a cold or the flu. If you are sick, stay away from others as much as possible.    Avoid irritants in the air.  Avoid chemicals, fumes, and dust. Wear a face mask if you must work around dust or fumes. Stay inside on days when air pollution levels are high. If you have allergies, stay inside when pollen counts are high. Do not use aerosol products, such as spray-on deodorant, bug spray, and hair spray.    Do not smoke or be around others who are smoking.  Nicotine and other chemicals in cigarettes and cigars can cause lung damage. Ask your healthcare provider for information if you currently smoke and need help to quit. E-cigarettes or smokeless tobacco still contain nicotine. Talk to your healthcare provider before you use these products.  Follow up with your doctor as directed:  Write down questions you have so you will remember to ask them during your follow-up visits.  © Copyright [employer] 2023 Information is for End User's use only and may not be sold, redistributed or otherwise used for commercial purposes.  The above information is an  only. It is not intended as medical advice for individual conditions or treatments. Talk to your doctor, nurse or pharmacist before following any medical regimen to see if it is safe and effective for you.

## 2023-12-22 NOTE — LETTER
December 22, 2023     Patient: Melvina Salinas   YOB: 1965   Date of Visit: 12/22/2023       To Whom it May Concern:    Melvina Salinas is under my professional care. She was seen virtually on 12/22/2023 for illness that began 12/18/23. She may return to work on 12/26/23 .    If you have any questions or concerns, please don't hesitate to call.         Sincerely,          Shannon D Severino, PA-C        CC: No Recipients

## 2023-12-22 NOTE — TELEPHONE ENCOUNTER
I spoke with the patient and let her know that we would need to see her and evaluate her in order to send in the appropriate medications. She is going to use the telehealth option through [employer]. I did apologize to her that we could not get her into the office today.

## 2023-12-22 NOTE — TELEPHONE ENCOUNTER
Patient called in to get same day appt with Dr. Gómez Chair was unable to schedule her with any one at the practice. She was requesting if can be prescribed with medicine for her coughing and send in the  prescription to her pharmacy at [facility]. She has recovered from Fever and sinus pressure but the cough is still on going for past 2 weeks. Please advise and return call to patient. Thanks.

## 2023-12-22 NOTE — CARE ANYWHERE EVISITS
Visit Summary for KONRAD REVELES - Gender: Female - Date of Birth: 1965  Date: [id number] - Duration: 9 minutes  Patient: KONRAD REVLEES  Provider: Shannon Severino PA-C    Patient Contact Information  Address  [address] [place] [address] [place]; [place] [address]  [phone]    Visit Topics    Triage Questions   What is your current physical address in the event of a medical emergency? Answer []  Are you allergic to any medications? Answer []  Are you now or could you be pregnant? Answer []  Do you have any immune system compromise or chronic lung   disease? Answer []  Do you have any vulnerable family members in the home (infant, pregnant, cancer, elderly)? Answer []     Conversation Transcripts  [0A][0A] [Notification] You are connected with Shannon Severino PA-C, Urgent Care Specialist.[0A][Notification] KONRAD REVELES is located in [place].[0A][Notification] KONRAD REVELES has shared health history...[0A]    Diagnosis  Acute bronchitis    Procedures  Value: [id number] Code: CPT-4 UNLISTED E&M SERVICE    Medications Prescribed    No prescriptions ordered    Electronically signed by: Severino PA-C, Shannon(NPI [id number])

## 2024-01-05 ENCOUNTER — OFFICE VISIT (OUTPATIENT)
Dept: FAMILY MEDICINE CLINIC | Facility: MEDICAL CENTER | Age: 59
End: 2024-01-05
Payer: COMMERCIAL

## 2024-01-05 VITALS
HEART RATE: 87 BPM | HEIGHT: 56 IN | RESPIRATION RATE: 16 BRPM | OXYGEN SATURATION: 97 % | BODY MASS INDEX: 24.97 KG/M2 | TEMPERATURE: 97.8 F | DIASTOLIC BLOOD PRESSURE: 88 MMHG | WEIGHT: 111 LBS | SYSTOLIC BLOOD PRESSURE: 134 MMHG

## 2024-01-05 DIAGNOSIS — J98.01 BRONCHOSPASM: ICD-10-CM

## 2024-01-05 DIAGNOSIS — J40 BRONCHITIS: Primary | ICD-10-CM

## 2024-01-05 PROCEDURE — 99213 OFFICE O/P EST LOW 20 MIN: CPT

## 2024-01-05 RX ORDER — ALBUTEROL SULFATE 90 UG/1
2 AEROSOL, METERED RESPIRATORY (INHALATION) EVERY 4 HOURS PRN
Qty: 8.5 G | Refills: 0 | Status: SHIPPED | OUTPATIENT
Start: 2024-01-05

## 2024-01-05 NOTE — PATIENT INSTRUCTIONS
Continue albuterol inhaler as needed.    Start flonase, use once daily.     Start medrol dose pack in 1 week as previously prescribed if no improvement.

## 2024-01-05 NOTE — PROGRESS NOTES
Assessment/Plan:    Treatment as below.     1. Bronchitis  Continue albuterol inhaler as needed.  Add Flonase once daily.  Advised to start Medrol Dosepak as previously prescribed by TeleMed provider if symptoms persist after 1 week.  Sleep with head of bed propped and run cool-mist humidifier at bedtime.  Call office if symptoms worsen or fail to improve.    2. Bronchospasm  - albuterol (ProAir HFA) 90 mcg/act inhaler; Inhale 2 puffs every 4 (four) hours as needed for wheezing or shortness of breath  Dispense: 8.5 g; Refill: 0      Subjective:      Patient ID: Melvina Salinas is a 58 y.o. female.    58-year-old female presents for recheck cough.  She was seen via video visit by internal medicine Shannon Severino-PA on 12/22, diagnosed with bronchitis.  At that time she was prescribed Phenergan-DM and course of methylprednisolone.  She was advised to follow-up with PCP. She reports never starting steroid.   She is complaining of post nasal drip and cough lingering since 12/22. Denies sob, body aches, fever, sore throat, ear pain. Cough is improving and usually occurs when in the cold, damp areas.  She has been using albuterol inhaler as needed which does give her relief. She has this from past diagnosis of Bronchitis. Denies history of asthma. Requesting refill for this.  She did test for covid which returned negative.         The following portions of the patient's history were reviewed and updated as appropriate: allergies, current medications, past medical history, past social history, past surgical history, and problem list.    Review of Systems   Constitutional: Negative.    HENT:  Positive for postnasal drip.    Eyes: Negative.    Respiratory:  Positive for cough.    Cardiovascular: Negative.    Gastrointestinal: Negative.    Endocrine: Negative.    Genitourinary: Negative.    Musculoskeletal: Negative.    Skin: Negative.    Allergic/Immunologic: Negative.    Neurological: Negative.    Hematological:  "Negative.    Psychiatric/Behavioral: Negative.           Objective:      /88 (BP Location: Left arm, Patient Position: Sitting, Cuff Size: Standard)   Pulse 87   Temp 97.8 °F (36.6 °C) (Tympanic)   Resp 16   Ht 4' 8\" (1.422 m)   Wt 50.3 kg (111 lb)   SpO2 97%   BMI 24.89 kg/m²          Physical Exam  Vitals and nursing note reviewed.   Constitutional:       General: She is not in acute distress.     Appearance: Normal appearance. She is not ill-appearing.   HENT:      Head: Normocephalic and atraumatic.      Right Ear: Tympanic membrane and ear canal normal.      Left Ear: Tympanic membrane and ear canal normal.      Nose: Nose normal.      Right Sinus: No maxillary sinus tenderness or frontal sinus tenderness.      Left Sinus: No maxillary sinus tenderness or frontal sinus tenderness.      Mouth/Throat:      Mouth: Mucous membranes are moist.      Pharynx: Oropharynx is clear.   Eyes:      Conjunctiva/sclera: Conjunctivae normal.   Cardiovascular:      Rate and Rhythm: Normal rate and regular rhythm.      Pulses: Normal pulses.      Heart sounds: Normal heart sounds.   Pulmonary:      Effort: Pulmonary effort is normal. No respiratory distress.      Breath sounds: Normal breath sounds. No stridor. No wheezing, rhonchi or rales.   Musculoskeletal:      Cervical back: Normal range of motion and neck supple.   Lymphadenopathy:      Cervical: No cervical adenopathy.   Skin:     General: Skin is warm and dry.   Neurological:      General: No focal deficit present.      Mental Status: She is alert and oriented to person, place, and time. Mental status is at baseline.   Psychiatric:         Mood and Affect: Mood normal.         Behavior: Behavior normal.         Thought Content: Thought content normal.                    YEVGENIY Goldsmith  "

## 2024-04-26 DIAGNOSIS — F41.9 ANXIETY: ICD-10-CM

## 2024-04-26 RX ORDER — ALPRAZOLAM 0.25 MG/1
0.25 TABLET ORAL 2 TIMES DAILY PRN
Qty: 30 TABLET | Refills: 0 | Status: SHIPPED | OUTPATIENT
Start: 2024-04-26

## 2024-04-26 NOTE — TELEPHONE ENCOUNTER
Reason for call:   [x] Refill   [] Prior Auth  [] Other:     Office:   [x] PCP/Provider - [facility]  [] Specialty/Provider -     Medication: ALPRAZolam (XANAX) 0.25 mg tablet      Dose/Frequency: Take 1 tablet (0.25 mg total) by mouth 2 (two) times a day as needed for anxiety     Quantity: 30    Pharmacy: [facility]/pharmacy #[id number] - [place], [place] - [address]     Does the patient have enough for 3 days?   [x] Yes   [] No - Send as HP to POD

## 2024-08-08 ENCOUNTER — ANNUAL EXAM (OUTPATIENT)
Dept: GYNECOLOGY | Facility: CLINIC | Age: 59
End: 2024-08-08
Payer: COMMERCIAL

## 2024-08-08 VITALS
SYSTOLIC BLOOD PRESSURE: 110 MMHG | DIASTOLIC BLOOD PRESSURE: 60 MMHG | BODY MASS INDEX: 23.73 KG/M2 | HEIGHT: 57 IN | WEIGHT: 110 LBS

## 2024-08-08 DIAGNOSIS — Z01.419 ENCOUNTER FOR GYNECOLOGICAL EXAMINATION WITHOUT ABNORMAL FINDING: ICD-10-CM

## 2024-08-08 DIAGNOSIS — M54.42 ACUTE MIDLINE LOW BACK PAIN WITH LEFT-SIDED SCIATICA: ICD-10-CM

## 2024-08-08 DIAGNOSIS — Z12.31 SCREENING MAMMOGRAM FOR BREAST CANCER: Primary | ICD-10-CM

## 2024-08-08 PROCEDURE — S0612 ANNUAL GYNECOLOGICAL EXAMINA: HCPCS | Performed by: OBSTETRICS & GYNECOLOGY

## 2024-08-08 NOTE — PROGRESS NOTES
Ambulatory Visit  Name: Melvina Salinas      : 1965      MRN: [id number]  Encounter Provider: González Yen MD  Encounter Date: 2024   Encounter department: [facility] GYNECOLOGY [place]    Assessment & Plan         Normal breast and GYN exam  Lower back pain radiating to left hip and left leg  Normal Pap smear negative HPV   Normal colonoscopy         Plan: Rx mammogram.  Patient is not interested in repeating colonoscopy or Cologuard.  Continue healthy diet and exercise.  Recommend getting evaluated by family physician for lower back sciatica.  Schedule DEXA he will scan here in the office.     History of Present Illness G2, P2     Melvina Salinas is a 59 y.o. female who presents for annual exam no GYN complaints.  Patient sits at the desk at work and has noted lower back pain radiating to her left hip and leg.  Has not been evaluated.  Requesting bone density testing.  Discussed DEXA heel scan.  Denies any breast bowel or bladder issues.  Denies any pelvic pain or vaginal bleeding.  Presently not sexually active.  No change in family history.  Sister (breast cancer).  Medications reviewed    Objective     There were no vitals taken for this visit.    Physical Exam  Vitals and nursing note reviewed.   Constitutional:       General: She is not in acute distress.     Appearance: She is well-developed.   HENT:      Head: Normocephalic and atraumatic.   Eyes:      Conjunctiva/sclera: Conjunctivae normal.   Cardiovascular:      Rate and Rhythm: Normal rate and regular rhythm.      Heart sounds: No murmur heard.  Pulmonary:      Effort: Pulmonary effort is normal. No respiratory distress.      Breath sounds: Normal breath sounds.   Chest:   Breasts:     Right: Normal.      Left: Normal.   Abdominal:      Palpations: Abdomen is soft.      Tenderness: There is no abdominal tenderness.   Genitourinary:     Vagina: Normal.      Cervix: Normal.      Uterus: Normal.       Adnexa: Right  adnexa normal and left adnexa normal.      Rectum: Normal.      Comments: External genitalia normal.  Urethra and [place]'s glands.  No uterine prolapse cystocele or rectocele.  Musculoskeletal:         General: No swelling.      Cervical back: Neck supple.   Skin:     General: Skin is warm and dry.      Capillary Refill: Capillary refill takes less than 2 seconds.   Neurological:      Mental Status: She is alert.   Psychiatric:         Mood and Affect: Mood normal.       Administrative Statements

## 2024-11-09 ENCOUNTER — HOSPITAL ENCOUNTER (OUTPATIENT)
Dept: RADIOLOGY | Age: 59
Discharge: HOME | End: 2024-11-09
Payer: COMMERCIAL

## 2024-11-09 DIAGNOSIS — Z12.31 SCREENING MAMMOGRAM FOR BREAST CANCER: ICD-10-CM

## 2024-11-09 PROCEDURE — 77067 SCR MAMMO BI INCL CAD: CPT

## 2024-11-09 PROCEDURE — 77063 BREAST TOMOSYNTHESIS BI: CPT

## 2024-11-18 ENCOUNTER — RESULTS FOLLOW-UP (OUTPATIENT)
Dept: GYNECOLOGY | Facility: CLINIC | Age: 59
End: 2024-11-18

## 2025-02-10 ENCOUNTER — OFFICE VISIT (OUTPATIENT)
Dept: FAMILY MEDICINE CLINIC | Facility: MEDICAL CENTER | Age: 60
End: 2025-02-10
Payer: COMMERCIAL

## 2025-02-10 VITALS
OXYGEN SATURATION: 98 % | WEIGHT: 113.2 LBS | HEART RATE: 82 BPM | RESPIRATION RATE: 18 BRPM | TEMPERATURE: 98.8 F | DIASTOLIC BLOOD PRESSURE: 70 MMHG | HEIGHT: 57 IN | SYSTOLIC BLOOD PRESSURE: 120 MMHG | BODY MASS INDEX: 24.42 KG/M2

## 2025-02-10 DIAGNOSIS — J98.01 BRONCHOSPASM: ICD-10-CM

## 2025-02-10 DIAGNOSIS — F41.9 ANXIETY: ICD-10-CM

## 2025-02-10 DIAGNOSIS — L28.2 PRURITIC RASH: Primary | ICD-10-CM

## 2025-02-10 PROCEDURE — 99214 OFFICE O/P EST MOD 30 MIN: CPT | Performed by: STUDENT IN AN ORGANIZED HEALTH CARE EDUCATION/TRAINING PROGRAM

## 2025-02-10 RX ORDER — ALBUTEROL SULFATE 90 UG/1
2 INHALANT RESPIRATORY (INHALATION) EVERY 4 HOURS PRN
Qty: 8.5 G | Refills: 0 | Status: SHIPPED | OUTPATIENT
Start: 2025-02-10

## 2025-02-10 RX ORDER — HYDROXYZINE HYDROCHLORIDE 25 MG/1
25 TABLET, FILM COATED ORAL EVERY 6 HOURS PRN
Qty: 30 TABLET | Refills: 0 | Status: SHIPPED | OUTPATIENT
Start: 2025-02-10

## 2025-02-10 RX ORDER — PERMETHRIN 50 MG/G
CREAM TOPICAL ONCE
Qty: 60 G | Refills: 0 | Status: SHIPPED | OUTPATIENT
Start: 2025-02-10 | End: 2025-02-10

## 2025-02-10 RX ORDER — ALPRAZOLAM 0.25 MG/1
0.25 TABLET ORAL 2 TIMES DAILY PRN
Qty: 30 TABLET | Refills: 0 | Status: SHIPPED | OUTPATIENT
Start: 2025-02-10

## 2025-02-10 NOTE — PROGRESS NOTES
"Name: Melvina Salinas      : 1965      MRN: [id number]  Encounter Provider: Rico Foote DO  Encounter Date: 2/10/2025   Encounter department: [facility] [place]  :  Assessment & Plan  Pruritic rash    Orders:    permethrin (ELIMITE) 5 % cream; Apply topically once for 1 dose Apply neck down, wash off after 8 to 14 hours    hydrOXYzine HCL (ATARAX) 25 mg tablet; Take 1 tablet (25 mg total) by mouth every 6 (six) hours as needed for itching  Call with update in 5-7 days and sooner as needed  Anxiety    Orders:    ALPRAZolam (XANAX) 0.25 mg tablet; Take 1 tablet (0.25 mg total) by mouth 2 (two) times a day as needed for anxiety    Bronchospasm    Orders:    albuterol (ProAir HFA) 90 mcg/act inhaler; Inhale 2 puffs every 4 (four) hours as needed for wheezing or shortness of breath           History of Present Illness   HPI      Melvina Salinas is a 60-year-old female who presents for acute visit.  She complains of rash.  Rash started on her bilateral arms a couple of weeks ago she reports.  Rash is pruritic especially at night it disrupts her sleep because of that.  Patient notices red lesions bilateral arms and right knee.  She mentions her back was pruritic yesterday.  No new food , pets or laundry detergent for example. No fever, chills, or arthralgias.  Patient also requests refills of Xanax and albuterol inhaler.    Review of Systems    As noted in HPI     Objective   /70 (BP Location: Left arm, Patient Position: Standing, Cuff Size: Standard)   Pulse 82   Temp 98.8 °F (37.1 °C) (Temporal)   Resp 18   Ht 4' 8.69\" (1.44 m)   Wt 51.3 kg (113 lb 3.2 oz)   SpO2 98%   BMI 24.76 kg/m²      Physical Exam  Vitals reviewed.   Constitutional:       General: She is not in acute distress.     Appearance: Normal appearance.   HENT:      Head: Normocephalic and atraumatic.   Eyes:      Conjunctiva/sclera: Conjunctivae normal.   Pulmonary:      Effort: Pulmonary effort is normal. "   Skin:     Findings: Rash present.      Comments: Erythematous bites with some excoriations scattered no particular pattern bilateral arms, right knee and back   Neurological:      Mental Status: She is alert and oriented to person, place, and time.   Psychiatric:         Thought Content: Thought content normal.

## 2025-03-04 DIAGNOSIS — J98.01 BRONCHOSPASM: ICD-10-CM

## 2025-03-04 RX ORDER — ALBUTEROL SULFATE 90 UG/1
INHALANT RESPIRATORY (INHALATION)
Qty: 18 G | Refills: 1 | Status: SHIPPED | OUTPATIENT
Start: 2025-03-04

## 2025-05-31 DIAGNOSIS — F41.9 ANXIETY: ICD-10-CM

## 2025-06-03 RX ORDER — ALPRAZOLAM 0.25 MG
0.25 TABLET ORAL DAILY PRN
Qty: 30 TABLET | Refills: 0 | Status: SHIPPED | OUTPATIENT
Start: 2025-06-03

## 2025-06-03 NOTE — TELEPHONE ENCOUNTER
Patient called to request a refill for their alprazolam advised a refill was requested on 5/31 and is pending approval. Patient verbalized understanding and is in agreement.     Does the patient have enough for 3 days?   [x] Yes   [] No - Send as HP to POD

## 2025-07-11 DIAGNOSIS — F41.9 ANXIETY: ICD-10-CM

## 2025-07-15 RX ORDER — ALPRAZOLAM 0.25 MG
0.25 TABLET ORAL DAILY PRN
Qty: 30 TABLET | Refills: 0 | Status: SHIPPED | OUTPATIENT
Start: 2025-07-15

## (undated) DEVICE — ENSEAL LAPAROSCOPIC TISSUE SEALER G2 CURVED JAW FOR USE WITH G2 GENERATOR 5MM DIAMETER 35CM SHAFT LENGTH: Brand: ENSEAL

## (undated) DEVICE — STERILE MINOR LAPAROSCOPY PACK: Brand: CARDINAL HEALTH

## (undated) DEVICE — TRAY FOLEY 16FR URIMETER SURESTEP

## (undated) DEVICE — SCD SEQUENTIAL COMPRESSION COMFORT SLEEVE MEDIUM KNEE LENGTH: Brand: KENDALL SCD

## (undated) DEVICE — SUT MONOCRYL 4-0 PS-2 18 IN Y496G

## (undated) DEVICE — 3000CC GUARDIAN II: Brand: GUARDIAN

## (undated) DEVICE — INSUFLATION TUBING INSUFLOW (LEXION)

## (undated) DEVICE — GLOVE SRG BIOGEL ORTHOPEDIC 7.5

## (undated) DEVICE — ADHESIVE SKN CLSR HISTOACRYL FLEX 0.5ML LF

## (undated) DEVICE — SUT MONOCRYL 3-0 PS-2 18 IN Y497G

## (undated) DEVICE — SYRINGE 10ML LL

## (undated) DEVICE — MAYO STAND COVER: Brand: CONVERTORS

## (undated) DEVICE — ANTI-FOG SOLUTION WITH FOAM PAD: Brand: DEVON

## (undated) DEVICE — INTENDED FOR TISSUE SEPARATION, AND OTHER PROCEDURES THAT REQUIRE A SHARP SURGICAL BLADE TO PUNCTURE OR CUT.: Brand: BARD-PARKER SAFETY BLADES SIZE 11, STERILE

## (undated) DEVICE — CHLORAPREP HI-LITE 26ML ORANGE

## (undated) DEVICE — ENDOPATH XCEL UNIVERSAL TROCAR STABLILITY SLEEVES: Brand: ENDOPATH XCEL

## (undated) DEVICE — SUT VICRYL 0 UR-6 27 IN J603H

## (undated) DEVICE — ENDOPATH XCEL BLADELESS TROCARS WITH STABILITY SLEEVES: Brand: ENDOPATH XCEL